# Patient Record
Sex: MALE | URBAN - METROPOLITAN AREA
[De-identification: names, ages, dates, MRNs, and addresses within clinical notes are randomized per-mention and may not be internally consistent; named-entity substitution may affect disease eponyms.]

---

## 2019-08-30 ENCOUNTER — APPOINTMENT (OUTPATIENT)
Dept: RADIOLOGY | Facility: MEDICAL CENTER | Age: 29
DRG: 083 | End: 2019-08-30
Payer: COMMERCIAL

## 2019-08-30 ENCOUNTER — HOSPITAL ENCOUNTER (OUTPATIENT)
Dept: RADIOLOGY | Facility: MEDICAL CENTER | Age: 29
End: 2019-08-30

## 2019-08-30 ENCOUNTER — APPOINTMENT (OUTPATIENT)
Dept: RADIOLOGY | Facility: MEDICAL CENTER | Age: 29
DRG: 083 | End: 2019-08-30
Attending: SURGERY
Payer: COMMERCIAL

## 2019-08-30 ENCOUNTER — HOSPITAL ENCOUNTER (INPATIENT)
Facility: MEDICAL CENTER | Age: 29
LOS: 11 days | DRG: 083 | End: 2019-09-10
Attending: EMERGENCY MEDICINE | Admitting: SURGERY
Payer: COMMERCIAL

## 2019-08-30 DIAGNOSIS — S06.9X9A TRAUMATIC BRAIN INJURY WITH LOSS OF CONSCIOUSNESS, INITIAL ENCOUNTER (HCC): ICD-10-CM

## 2019-08-30 DIAGNOSIS — T14.90XA TRAUMA: ICD-10-CM

## 2019-08-30 DIAGNOSIS — T07.XXXA MULTIPLE CONTUSIONS: ICD-10-CM

## 2019-08-30 DIAGNOSIS — I62.9 INTRACRANIAL HEMORRHAGE (HCC): ICD-10-CM

## 2019-08-30 DIAGNOSIS — S01.01XA LACERATION OF SCALP WITHOUT FOREIGN BODY, INITIAL ENCOUNTER: ICD-10-CM

## 2019-08-30 PROBLEM — S01.00XA SCALP WOUND: Status: ACTIVE | Noted: 2019-08-30

## 2019-08-30 PROBLEM — Z53.09 CONTRAINDICATION TO DEEP VEIN THROMBOSIS (DVT) PROPHYLAXIS: Status: ACTIVE | Noted: 2019-08-30

## 2019-08-30 PROBLEM — S01.80XA WOUND, OPEN, FACE: Status: ACTIVE | Noted: 2019-08-30

## 2019-08-30 PROBLEM — S06.9XAA TRAUMATIC BRAIN INJURY (HCC): Status: ACTIVE | Noted: 2019-08-30

## 2019-08-30 LAB
ABO GROUP BLD: NORMAL
ALBUMIN SERPL BCP-MCNC: 4.8 G/DL (ref 3.2–4.9)
ALBUMIN/GLOB SERPL: 1.6 G/DL
ALP SERPL-CCNC: 72 U/L (ref 30–99)
ALT SERPL-CCNC: 59 U/L (ref 2–50)
ANION GAP SERPL CALC-SCNC: 14 MMOL/L (ref 0–11.9)
APTT PPP: 21.7 SEC (ref 24.7–36)
AST SERPL-CCNC: 37 U/L (ref 12–45)
BILIRUB SERPL-MCNC: 1 MG/DL (ref 0.1–1.5)
BLD GP AB SCN SERPL QL: NORMAL
BUN SERPL-MCNC: 15 MG/DL (ref 8–22)
CALCIUM SERPL-MCNC: 9.8 MG/DL (ref 8.5–10.5)
CFT BLD TEG: 2.6 MIN (ref 5–10)
CHLORIDE SERPL-SCNC: 101 MMOL/L (ref 96–112)
CLOT ANGLE BLD TEG: 64.2 DEGREES (ref 53–72)
CLOT LYSIS 30M P MA LENFR BLD TEG: 0 % (ref 0–8)
CO2 SERPL-SCNC: 21 MMOL/L (ref 20–33)
CREAT SERPL-MCNC: 0.91 MG/DL (ref 0.5–1.4)
CT.EXTRINSIC BLD ROTEM: 2.1 MIN (ref 1–3)
ERYTHROCYTE [DISTWIDTH] IN BLOOD BY AUTOMATED COUNT: 38.5 FL (ref 35.9–50)
ETHANOL BLD-MCNC: 0.01 G/DL
GLOBULIN SER CALC-MCNC: 3 G/DL (ref 1.9–3.5)
GLUCOSE BLD-MCNC: 130 MG/DL (ref 65–99)
GLUCOSE BLD-MCNC: 134 MG/DL (ref 65–99)
GLUCOSE SERPL-MCNC: 176 MG/DL (ref 65–99)
HCT VFR BLD AUTO: 44.9 % (ref 42–52)
HGB BLD-MCNC: 16.3 G/DL (ref 14–18)
INR PPP: 0.97 (ref 0.87–1.13)
LACTATE BLD-SCNC: 2.9 MMOL/L (ref 0.5–2)
MCF BLD TEG: 64.6 MM (ref 50–70)
MCH RBC QN AUTO: 32.1 PG (ref 27–33)
MCHC RBC AUTO-ENTMCNC: 36.3 G/DL (ref 33.7–35.3)
MCV RBC AUTO: 88.6 FL (ref 81.4–97.8)
PA AA BLD-ACNC: 4.6 %
PA ADP BLD-ACNC: 98.8 %
PLATELET # BLD AUTO: 227 K/UL (ref 164–446)
PMV BLD AUTO: 11 FL (ref 9–12.9)
POTASSIUM SERPL-SCNC: 4 MMOL/L (ref 3.6–5.5)
PROT SERPL-MCNC: 7.8 G/DL (ref 6–8.2)
PROTHROMBIN TIME: 13 SEC (ref 12–14.6)
RBC # BLD AUTO: 5.07 M/UL (ref 4.7–6.1)
RH BLD: NORMAL
SODIUM SERPL-SCNC: 136 MMOL/L (ref 135–145)
TEG ALGORITHM TGALG: ABNORMAL
WBC # BLD AUTO: 27.5 K/UL (ref 4.8–10.8)

## 2019-08-30 PROCEDURE — 304999 HCHG REPAIR-SIMPLE/INTERMED LEVEL 1

## 2019-08-30 PROCEDURE — 70450 CT HEAD/BRAIN W/O DYE: CPT

## 2019-08-30 PROCEDURE — 85384 FIBRINOGEN ACTIVITY: CPT

## 2019-08-30 PROCEDURE — 85027 COMPLETE CBC AUTOMATED: CPT

## 2019-08-30 PROCEDURE — 12002 RPR S/N/AX/GEN/TRNK2.6-7.5CM: CPT | Performed by: SURGERY

## 2019-08-30 PROCEDURE — 85730 THROMBOPLASTIN TIME PARTIAL: CPT

## 2019-08-30 PROCEDURE — 82962 GLUCOSE BLOOD TEST: CPT

## 2019-08-30 PROCEDURE — 86900 BLOOD TYPING SEROLOGIC ABO: CPT

## 2019-08-30 PROCEDURE — 700105 HCHG RX REV CODE 258: Performed by: NURSE PRACTITIONER

## 2019-08-30 PROCEDURE — 305308 HCHG STAPLER,SKIN,DISP.

## 2019-08-30 PROCEDURE — 0HQ0XZZ REPAIR SCALP SKIN, EXTERNAL APPROACH: ICD-10-PCS | Performed by: SURGERY

## 2019-08-30 PROCEDURE — 86901 BLOOD TYPING SEROLOGIC RH(D): CPT

## 2019-08-30 PROCEDURE — 85576 BLOOD PLATELET AGGREGATION: CPT | Mod: 91

## 2019-08-30 PROCEDURE — 83605 ASSAY OF LACTIC ACID: CPT

## 2019-08-30 PROCEDURE — A9270 NON-COVERED ITEM OR SERVICE: HCPCS | Performed by: SURGERY

## 2019-08-30 PROCEDURE — 80053 COMPREHEN METABOLIC PANEL: CPT

## 2019-08-30 PROCEDURE — 86850 RBC ANTIBODY SCREEN: CPT

## 2019-08-30 PROCEDURE — G0390 TRAUMA RESPONS W/HOSP CRITI: HCPCS

## 2019-08-30 PROCEDURE — 770022 HCHG ROOM/CARE - ICU (200)

## 2019-08-30 PROCEDURE — 700111 HCHG RX REV CODE 636 W/ 250 OVERRIDE (IP): Performed by: SURGERY

## 2019-08-30 PROCEDURE — 85347 COAGULATION TIME ACTIVATED: CPT

## 2019-08-30 PROCEDURE — 85610 PROTHROMBIN TIME: CPT

## 2019-08-30 PROCEDURE — 700102 HCHG RX REV CODE 250 W/ 637 OVERRIDE(OP): Performed by: SURGERY

## 2019-08-30 PROCEDURE — 700105 HCHG RX REV CODE 258: Performed by: SURGERY

## 2019-08-30 PROCEDURE — 700101 HCHG RX REV CODE 250: Performed by: SURGERY

## 2019-08-30 PROCEDURE — 99291 CRITICAL CARE FIRST HOUR: CPT

## 2019-08-30 PROCEDURE — 80307 DRUG TEST PRSMV CHEM ANLYZR: CPT

## 2019-08-30 PROCEDURE — 99223 1ST HOSP IP/OBS HIGH 75: CPT | Mod: 25 | Performed by: SURGERY

## 2019-08-30 RX ORDER — ONDANSETRON 2 MG/ML
4 INJECTION INTRAMUSCULAR; INTRAVENOUS EVERY 4 HOURS PRN
Status: DISCONTINUED | OUTPATIENT
Start: 2019-08-30 | End: 2019-09-02

## 2019-08-30 RX ORDER — AMOXICILLIN 250 MG
1 CAPSULE ORAL
Status: DISCONTINUED | OUTPATIENT
Start: 2019-08-30 | End: 2019-09-10 | Stop reason: HOSPADM

## 2019-08-30 RX ORDER — POLYETHYLENE GLYCOL 3350 17 G/17G
1 POWDER, FOR SOLUTION ORAL 2 TIMES DAILY
Status: DISCONTINUED | OUTPATIENT
Start: 2019-08-30 | End: 2019-09-10 | Stop reason: HOSPADM

## 2019-08-30 RX ORDER — SODIUM CHLORIDE 9 MG/ML
INJECTION, SOLUTION INTRAVENOUS CONTINUOUS
Status: DISCONTINUED | OUTPATIENT
Start: 2019-08-30 | End: 2019-09-01

## 2019-08-30 RX ORDER — ENEMA 19; 7 G/133ML; G/133ML
1 ENEMA RECTAL
Status: DISCONTINUED | OUTPATIENT
Start: 2019-08-30 | End: 2019-09-10 | Stop reason: HOSPADM

## 2019-08-30 RX ORDER — LIDOCAINE HYDROCHLORIDE AND EPINEPHRINE BITARTRATE 20; .01 MG/ML; MG/ML
20 INJECTION, SOLUTION SUBCUTANEOUS ONCE
Status: COMPLETED | OUTPATIENT
Start: 2019-08-30 | End: 2019-08-30

## 2019-08-30 RX ORDER — FAMOTIDINE 20 MG/1
20 TABLET, FILM COATED ORAL 2 TIMES DAILY
Status: DISCONTINUED | OUTPATIENT
Start: 2019-08-30 | End: 2019-09-01

## 2019-08-30 RX ORDER — BISACODYL 10 MG
10 SUPPOSITORY, RECTAL RECTAL
Status: DISCONTINUED | OUTPATIENT
Start: 2019-08-30 | End: 2019-09-10 | Stop reason: HOSPADM

## 2019-08-30 RX ORDER — AMOXICILLIN 250 MG
1 CAPSULE ORAL NIGHTLY
Status: DISCONTINUED | OUTPATIENT
Start: 2019-08-30 | End: 2019-09-10 | Stop reason: HOSPADM

## 2019-08-30 RX ORDER — MORPHINE SULFATE 4 MG/ML
2 INJECTION, SOLUTION INTRAMUSCULAR; INTRAVENOUS
Status: DISCONTINUED | OUTPATIENT
Start: 2019-08-30 | End: 2019-09-01

## 2019-08-30 RX ORDER — OXYCODONE HYDROCHLORIDE 5 MG/1
2.5 TABLET ORAL
Status: DISCONTINUED | OUTPATIENT
Start: 2019-08-30 | End: 2019-09-06

## 2019-08-30 RX ORDER — DOCUSATE SODIUM 100 MG/1
100 CAPSULE, LIQUID FILLED ORAL 2 TIMES DAILY
Status: DISCONTINUED | OUTPATIENT
Start: 2019-08-30 | End: 2019-09-10 | Stop reason: HOSPADM

## 2019-08-30 RX ORDER — OXYCODONE HYDROCHLORIDE 5 MG/1
5 TABLET ORAL
Status: DISCONTINUED | OUTPATIENT
Start: 2019-08-30 | End: 2019-09-06

## 2019-08-30 RX ADMIN — SODIUM CHLORIDE: 9 INJECTION, SOLUTION INTRAVENOUS at 19:48

## 2019-08-30 RX ADMIN — LIDOCAINE HYDROCHLORIDE AND EPINEPHRINE 20 ML: 20; 10 INJECTION, SOLUTION INFILTRATION; PERINEURAL at 12:39

## 2019-08-30 RX ADMIN — FAMOTIDINE 20 MG: 10 INJECTION INTRAVENOUS at 17:43

## 2019-08-30 RX ADMIN — SODIUM CHLORIDE 500 MG: 9 INJECTION, SOLUTION INTRAVENOUS at 14:24

## 2019-08-30 RX ADMIN — OXYCODONE HYDROCHLORIDE 2.5 MG: 5 TABLET ORAL at 22:36

## 2019-08-30 SDOH — HEALTH STABILITY: MENTAL HEALTH: HOW OFTEN DO YOU HAVE A DRINK CONTAINING ALCOHOL?: 4 OR MORE TIMES A WEEK

## 2019-08-30 SDOH — HEALTH STABILITY: MENTAL HEALTH: HOW MANY STANDARD DRINKS CONTAINING ALCOHOL DO YOU HAVE ON A TYPICAL DAY?: 10 OR MORE

## 2019-08-30 SDOH — HEALTH STABILITY: MENTAL HEALTH: HOW OFTEN DO YOU HAVE 6 OR MORE DRINKS ON ONE OCCASION?: WEEKLY

## 2019-08-30 ASSESSMENT — COGNITIVE AND FUNCTIONAL STATUS - GENERAL
SUGGESTED CMS G CODE MODIFIER MOBILITY: CH
TOILETING: A LITTLE
DAILY ACTIVITIY SCORE: 23
SUGGESTED CMS G CODE MODIFIER DAILY ACTIVITY: CI
MOBILITY SCORE: 24

## 2019-08-30 ASSESSMENT — LIFESTYLE VARIABLES
TOTAL SCORE: 0
TOTAL SCORE: 0
AVERAGE NUMBER OF DAYS PER WEEK YOU HAVE A DRINK CONTAINING ALCOHOL: 5
EVER HAD A DRINK FIRST THING IN THE MORNING TO STEADY YOUR NERVES TO GET RID OF A HANGOVER: NO
HAVE YOU EVER FELT YOU SHOULD CUT DOWN ON YOUR DRINKING: NO
DO YOU DRINK ALCOHOL: NO
TOTAL SCORE: 0
HAVE PEOPLE ANNOYED YOU BY CRITICIZING YOUR DRINKING: NO
CONSUMPTION TOTAL: POSITIVE
ON A TYPICAL DAY WHEN YOU DRINK ALCOHOL HOW MANY DRINKS DO YOU HAVE: 24
EVER FELT BAD OR GUILTY ABOUT YOUR DRINKING: NO
ALCOHOL_USE: YES
EVER_SMOKED: YES
HOW MANY TIMES IN THE PAST YEAR HAVE YOU HAD 5 OR MORE DRINKS IN A DAY: 50
DOES PATIENT WANT TO STOP DRINKING: NO

## 2019-08-30 ASSESSMENT — PATIENT HEALTH QUESTIONNAIRE - PHQ9
SUM OF ALL RESPONSES TO PHQ9 QUESTIONS 1 AND 2: 0
2. FEELING DOWN, DEPRESSED, IRRITABLE, OR HOPELESS: NOT AT ALL
1. LITTLE INTEREST OR PLEASURE IN DOING THINGS: NOT AT ALL

## 2019-08-30 NOTE — ED NOTES
ICU RN at bedside for transfer, reports given.  All belongings with pt and Guards with pt on transfer to SICU.
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
## 2019-08-30 NOTE — ASSESSMENT & PLAN NOTE
Outside imaging with frontal and frontoparietal subarachnoid hemorrhage with small frontal subdural.  Follow up CT head showed slightly increased amount of intracranial hemorrhage.  8/31 Follow up head CT with even further worsening of right frontal lobe hemorrhage with additional scattered punctate hemorrhagic foci in the right frontal lobe and with increased right frontal and parietal subarachnoid hemorrhages.  9/4 Follow up heat CT with mild interval increase in size of right frontal lobe intraparenchymal hematoma. There is mild surrounding vasogenic edema. Improved right frontoparietal subarachnoid hemorrhage.  - TEG without significant inhibition.  9/5 Repeat head CT stable.  9/7 Plan for repeat head CT.  Non-operative management.  Post traumatic pharmacologic seizure prophylaxis for 1 week.  9/2 Cog eval completed, recommending ongoing SLP treatment, not safe for independent discharge.  Taco Muñiz MD. Neurosurgery.

## 2019-08-30 NOTE — PROGRESS NOTES
Patient arrived to [facility] at 1400 with ACLS nurse and tech.. Patient lethargic but answering all questions correctly. Moves all, pupils reactive and equal.   BP: 132/84  HR: 93  Pulse ox: 98 RA  RR: 17  WT: 85.8kg  Temp: 97.1F
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
## 2019-08-30 NOTE — ASSESSMENT & PLAN NOTE
Systemic anticoagulation contraindicated secondary to elevated bleeding risk.  RAP score 3.  9/2 Trauma screening bilateral lower extremity venous duplex negative for above knee DVT.  Ambulate TID.
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
## 2019-08-31 ENCOUNTER — APPOINTMENT (OUTPATIENT)
Dept: RADIOLOGY | Facility: MEDICAL CENTER | Age: 29
DRG: 083 | End: 2019-08-31
Attending: NEUROLOGICAL SURGERY
Payer: COMMERCIAL

## 2019-08-31 LAB
ABO + RH BLD: NORMAL
ALBUMIN SERPL BCP-MCNC: 4.4 G/DL (ref 3.2–4.9)
ALBUMIN/GLOB SERPL: 1.7 G/DL
ALP SERPL-CCNC: 64 U/L (ref 30–99)
ALT SERPL-CCNC: 46 U/L (ref 2–50)
ANION GAP SERPL CALC-SCNC: 10 MMOL/L (ref 0–11.9)
AST SERPL-CCNC: 21 U/L (ref 12–45)
BASOPHILS # BLD AUTO: 0.2 % (ref 0–1.8)
BASOPHILS # BLD: 0.03 K/UL (ref 0–0.12)
BILIRUB SERPL-MCNC: 1.2 MG/DL (ref 0.1–1.5)
BUN SERPL-MCNC: 11 MG/DL (ref 8–22)
CALCIUM SERPL-MCNC: 9.1 MG/DL (ref 8.5–10.5)
CHLORIDE SERPL-SCNC: 104 MMOL/L (ref 96–112)
CO2 SERPL-SCNC: 24 MMOL/L (ref 20–33)
CREAT SERPL-MCNC: 0.79 MG/DL (ref 0.5–1.4)
EOSINOPHIL # BLD AUTO: 0.01 K/UL (ref 0–0.51)
EOSINOPHIL NFR BLD: 0.1 % (ref 0–6.9)
ERYTHROCYTE [DISTWIDTH] IN BLOOD BY AUTOMATED COUNT: 40 FL (ref 35.9–50)
GLOBULIN SER CALC-MCNC: 2.6 G/DL (ref 1.9–3.5)
GLUCOSE BLD-MCNC: 121 MG/DL (ref 65–99)
GLUCOSE BLD-MCNC: 136 MG/DL (ref 65–99)
GLUCOSE SERPL-MCNC: 129 MG/DL (ref 65–99)
HCT VFR BLD AUTO: 40.7 % (ref 42–52)
HGB BLD-MCNC: 14.2 G/DL (ref 14–18)
IMM GRANULOCYTES # BLD AUTO: 0.05 K/UL (ref 0–0.11)
IMM GRANULOCYTES NFR BLD AUTO: 0.3 % (ref 0–0.9)
LYMPHOCYTES # BLD AUTO: 1.69 K/UL (ref 1–4.8)
LYMPHOCYTES NFR BLD: 10.8 % (ref 22–41)
MCH RBC QN AUTO: 31.1 PG (ref 27–33)
MCHC RBC AUTO-ENTMCNC: 34.9 G/DL (ref 33.7–35.3)
MCV RBC AUTO: 89.3 FL (ref 81.4–97.8)
MONOCYTES # BLD AUTO: 1.33 K/UL (ref 0–0.85)
MONOCYTES NFR BLD AUTO: 8.5 % (ref 0–13.4)
NEUTROPHILS # BLD AUTO: 12.61 K/UL (ref 1.82–7.42)
NEUTROPHILS NFR BLD: 80.1 % (ref 44–72)
NRBC # BLD AUTO: 0 K/UL
NRBC BLD-RTO: 0 /100 WBC
PLATELET # BLD AUTO: 229 K/UL (ref 164–446)
PMV BLD AUTO: 10.6 FL (ref 9–12.9)
POTASSIUM SERPL-SCNC: 3.9 MMOL/L (ref 3.6–5.5)
PROT SERPL-MCNC: 7 G/DL (ref 6–8.2)
RBC # BLD AUTO: 4.56 M/UL (ref 4.7–6.1)
SODIUM SERPL-SCNC: 138 MMOL/L (ref 135–145)
WBC # BLD AUTO: 15.7 K/UL (ref 4.8–10.8)

## 2019-08-31 PROCEDURE — 700105 HCHG RX REV CODE 258: Performed by: SURGERY

## 2019-08-31 PROCEDURE — 85025 COMPLETE CBC W/AUTO DIFF WBC: CPT

## 2019-08-31 PROCEDURE — 700102 HCHG RX REV CODE 250 W/ 637 OVERRIDE(OP): Performed by: SURGERY

## 2019-08-31 PROCEDURE — 99232 SBSQ HOSP IP/OBS MODERATE 35: CPT | Performed by: SURGERY

## 2019-08-31 PROCEDURE — 82962 GLUCOSE BLOOD TEST: CPT

## 2019-08-31 PROCEDURE — 700105 HCHG RX REV CODE 258: Performed by: NURSE PRACTITIONER

## 2019-08-31 PROCEDURE — 70450 CT HEAD/BRAIN W/O DYE: CPT

## 2019-08-31 PROCEDURE — 700111 HCHG RX REV CODE 636 W/ 250 OVERRIDE (IP): Performed by: SURGERY

## 2019-08-31 PROCEDURE — 80053 COMPREHEN METABOLIC PANEL: CPT

## 2019-08-31 PROCEDURE — 700112 HCHG RX REV CODE 229: Performed by: SURGERY

## 2019-08-31 PROCEDURE — A9270 NON-COVERED ITEM OR SERVICE: HCPCS | Performed by: SURGERY

## 2019-08-31 PROCEDURE — 770001 HCHG ROOM/CARE - MED/SURG/GYN PRIV*

## 2019-08-31 RX ORDER — LEVETIRACETAM 250 MG/1
500 TABLET ORAL 2 TIMES DAILY
Status: COMPLETED | OUTPATIENT
Start: 2019-08-31 | End: 2019-09-06

## 2019-08-31 RX ADMIN — SODIUM CHLORIDE: 9 INJECTION, SOLUTION INTRAVENOUS at 18:05

## 2019-08-31 RX ADMIN — OXYCODONE HYDROCHLORIDE 5 MG: 5 TABLET ORAL at 17:07

## 2019-08-31 RX ADMIN — MORPHINE SULFATE 2 MG: 4 INJECTION INTRAVENOUS at 15:11

## 2019-08-31 RX ADMIN — SODIUM CHLORIDE 500 MG: 9 INJECTION, SOLUTION INTRAVENOUS at 02:27

## 2019-08-31 RX ADMIN — ONDANSETRON 4 MG: 2 INJECTION INTRAMUSCULAR; INTRAVENOUS at 22:14

## 2019-08-31 RX ADMIN — FAMOTIDINE 20 MG: 20 TABLET ORAL at 06:23

## 2019-08-31 RX ADMIN — MORPHINE SULFATE 2 MG: 4 INJECTION INTRAVENOUS at 08:10

## 2019-08-31 RX ADMIN — MORPHINE SULFATE 2 MG: 4 INJECTION INTRAVENOUS at 18:11

## 2019-08-31 RX ADMIN — DOCUSATE SODIUM 100 MG: 100 CAPSULE, LIQUID FILLED ORAL at 06:23

## 2019-08-31 RX ADMIN — ONDANSETRON 4 MG: 2 INJECTION INTRAMUSCULAR; INTRAVENOUS at 18:05

## 2019-08-31 RX ADMIN — MORPHINE SULFATE 2 MG: 4 INJECTION INTRAVENOUS at 22:14

## 2019-08-31 RX ADMIN — LEVETIRACETAM 500 MG: 500 TABLET, FILM COATED ORAL at 17:02

## 2019-08-31 RX ADMIN — POLYETHYLENE GLYCOL 3350 1 PACKET: 17 POWDER, FOR SOLUTION ORAL at 17:02

## 2019-08-31 RX ADMIN — OXYCODONE HYDROCHLORIDE 5 MG: 5 TABLET ORAL at 06:24

## 2019-08-31 RX ADMIN — FAMOTIDINE 20 MG: 20 TABLET ORAL at 17:02

## 2019-08-31 RX ADMIN — SODIUM CHLORIDE: 9 INJECTION, SOLUTION INTRAVENOUS at 02:29

## 2019-08-31 RX ADMIN — DOCUSATE SODIUM 100 MG: 100 CAPSULE, LIQUID FILLED ORAL at 17:02

## 2019-08-31 ASSESSMENT — ENCOUNTER SYMPTOMS
HEADACHES: 1
MYALGIAS: 1
DOUBLE VISION: 0
SHORTNESS OF BREATH: 0
NAUSEA: 0
ABDOMINAL PAIN: 0

## 2019-08-31 ASSESSMENT — COPD QUESTIONNAIRES
DURING THE PAST 4 WEEKS HOW MUCH DID YOU FEEL SHORT OF BREATH: NONE/LITTLE OF THE TIME
HAVE YOU SMOKED AT LEAST 100 CIGARETTES IN YOUR ENTIRE LIFE: NO/DON'T KNOW
COPD SCREENING SCORE: 0
DO YOU EVER COUGH UP ANY MUCUS OR PHLEGM?: NO/ONLY WITH OCCASIONAL COLDS OR INFECTIONS

## 2019-08-31 ASSESSMENT — LIFESTYLE VARIABLES: SUBSTANCE_ABUSE: 0
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
## 2019-08-31 NOTE — THERAPY
Speech Therapy Contact Note:  Order received for clinical swallow evaluation. Per RN, swallow evaluation is no longer indicated per MD, however, pt will need cognitive evaluation. SLP to follow.

## 2019-08-31 NOTE — CARE PLAN
Problem: Knowledge Deficit  Goal: Knowledge of disease process/condition, treatment plan, diagnostic tests, and medications will improve  Intervention: Explain information regarding disease process/condition, treatment plan, diagnostic tests, and medications and document in education  Note:   Education provided on disease process and plan of care goals.      Problem: Pain Management  Goal: Pain level will decrease to patient's comfort goal  Intervention: Educate and implement non-pharmacologic comfort measures. Examples: relaxation, distration, play therapy, activity therapy, massage, etc.  Note:   Pharmacological and non pharmacological interventions in place to help with pain management.

## 2019-08-31 NOTE — CARE PLAN
Problem: Safety  Goal: Will remain free from injury  8/30/2019 1931 by Sasha Valente R.N.  Outcome: PROGRESSING AS EXPECTED  Note:   Bed is locked and in low position. Bed alarm is on. Patient is close to nursing station. Call light is within reach and educated on how to use it.        Problem: Pain Management  Goal: Pain level will decrease to patient's comfort goal  Outcome: PROGRESSING AS EXPECTED  Note:   Pain is assessed every 2 hours. Patient given extra blankets and pillows for comfort.
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
## 2019-08-31 NOTE — CARE PLAN
Problem: Safety  Goal: Will remain free from injury  Outcome: PROGRESSING AS EXPECTED  Intervention: Educate patient and significant other/support system about adaptive mobility strategies and safe transfers  Note:   Patient educated on saftey precautions. Bed in low and locked position. Call light within reach. Room near nursing station. Patient encouraged to call staff for help.     Problem: Infection  Goal: Will remain free from infection  Outcome: PROGRESSING AS EXPECTED  Intervention: Implement standard precautions and perform hand washing before and after patient contact  Note:   Hand hygiene performed before and after assessing patient. Daily CHG bath completed. Scrub the hub prior to accessing IVs. Linens changed daily and PRN when soiled.
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
## 2019-08-31 NOTE — PROGRESS NOTES
Neurosurgery Progress Note    Subjective:  HA.  No V. F/u CT stable    Exam:  GCS 14-15   PERRL    BP  Min: 89/52  Max: 143/65  Pulse  Av.4  Min: 75  Max: 105  Resp  Av.1  Min: 14  Max: 56  Temp  Av.1 °C (98.8 °F)  Min: 36.1 °C (97 °F)  Max: 37.7 °C (99.8 °F)  SpO2  Av.5 %  Min: 92 %  Max: 100 %    No data recorded    Recent Labs     19  1236 19  0430   WBC 27.5* 15.7*   RBC 5.07 4.56*   HEMOGLOBIN 16.3 14.2   HEMATOCRIT 44.9 40.7*   MCV 88.6 89.3   MCH 32.1 31.1   MCHC 36.3* 34.9   RDW 38.5 40.0   PLATELETCT 227 229   MPV 11.0 10.6     Recent Labs     19  1236 19  0430   SODIUM 136 138   POTASSIUM 4.0 3.9   CHLORIDE 101 104   CO2 21 24   GLUCOSE 176* 129*   BUN 15 11   CREATININE 0.91 0.79   CALCIUM 9.8 9.1     Recent Labs     19  1236   APTT 21.7*   INR 0.97     Recent Labs     19  1236   REACTMIN 2.6*   CLOTKINET 2.1   CLOTANGL 64.2   MAXCLOTS 64.6   [id number] 0.0   PRCINADP 98.8   PRCINAA 4.6       Intake/Output       19 - 19 - 19 0659       Total  Total       Intake    I.V.  100  1275 1375  150  -- 150    Pre-Hospital Volume 100 -- 100 -- -- --    Trauma Resuscitation Volume 0 -- 0 -- -- --    Volume (mL) (NS infusion) -- 1275 1275 150 -- 150    Blood  0  -- 0  --  -- --    PRBC Total Volume (Non-Barcoded) 0 -- 0 -- -- --    FFP Total Volume (Non-Barcoded) 0 -- 0 -- -- --    Platelets Total Volume (Non-Barcoded) 0 -- 0 -- -- --    Cryoprecipitate (Pooled) Total Volume (Non-Barcoded) 0 -- 0 -- -- --    Other  --  0 0  --  -- --    Medications (PO/Enteral Liquids) -- 0 0 -- -- --    IV Piggyback  100  200 300  0  -- 0    Volume (mL) (levETIRAcetam (KEPPRA) 500 mg in  mL IVPB) 100 200 300 0 -- 0    Total Intake 200 1475 1675 150 -- 150       Output    Urine  400  1350 1750  0  -- 0    Number of Times Voided 1 x 3 x 4 x 0 x -- 0 x    Urine Void (mL) 400 1350 1750 0 -- 0     Other  0  -- 0  --  -- --    Pre-Hospital Output 0 -- 0 -- -- --    Trauma Resuscitation Output 0 -- 0 -- -- --    Stool  --  0 0  --  -- --    Number of Times Stooled 0 x 0 x 0 x 0 x -- 0 x    Measurable Stool (mL) -- 0 0 -- -- --    Blood  0  -- 0  --  -- --    Est. Blood Loss 0 -- 0 -- -- --    Total Output 400 1350 1750 0 -- 0       Net I/O     -200 125 -75 150 -- 150            Intake/Output Summary (Last 24 hours) at 8/31/2019 0842  Last data filed at 8/31/2019 0800  Gross per 24 hour   Intake 1825 ml   Output 1750 ml   Net 75 ml            • Respiratory Care per Protocol   Continuous RT   • Pharmacy Consult Request  1 Each PHARMACY TO DOSE   • docusate sodium  100 mg BID   • senna-docusate  1 Tab Nightly   • senna-docusate  1 Tab Q24HRS PRN   • polyethylene glycol/lytes  1 Packet BID   • magnesium hydroxide  30 mL DAILY   • bisacodyl  10 mg Q24HRS PRN   • fleet  1 Each Once PRN   • oxyCODONE immediate-release  2.5 mg Q3HRS PRN   • oxyCODONE immediate-release  5 mg Q3HRS PRN   • morphine injection  2 mg Q3HRS PRN   • famotidine  20 mg BID    Or   • famotidine  20 mg BID   • ondansetron  4 mg Q4HRS PRN   • levETIRAcetam (KEPPRA) IV  500 mg BID   • insulin regular  2-9 Units Q6HRS    And   • glucose  16 g Q15 MIN PRN    And   • dextrose 10% bolus  250 mL Q15 MIN PRN   • NS   Continuous       Assessment and Plan:  OK to floor.  Neuro checks q 4 hrs.

## 2019-08-31 NOTE — PROGRESS NOTES
Unable to complete admit profile. Preferred pharmacy unable to be completed due to patient's pharmacy is at [facility].
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
## 2019-09-01 PROCEDURE — 700102 HCHG RX REV CODE 250 W/ 637 OVERRIDE(OP): Performed by: NURSE PRACTITIONER

## 2019-09-01 PROCEDURE — A9270 NON-COVERED ITEM OR SERVICE: HCPCS | Performed by: NURSE PRACTITIONER

## 2019-09-01 PROCEDURE — 700111 HCHG RX REV CODE 636 W/ 250 OVERRIDE (IP): Performed by: SURGERY

## 2019-09-01 PROCEDURE — A9270 NON-COVERED ITEM OR SERVICE: HCPCS | Performed by: SURGERY

## 2019-09-01 PROCEDURE — 700102 HCHG RX REV CODE 250 W/ 637 OVERRIDE(OP): Performed by: SURGERY

## 2019-09-01 PROCEDURE — 770001 HCHG ROOM/CARE - MED/SURG/GYN PRIV*

## 2019-09-01 PROCEDURE — 700112 HCHG RX REV CODE 229: Performed by: SURGERY

## 2019-09-01 RX ORDER — ACETAMINOPHEN 500 MG
1000 TABLET ORAL EVERY 6 HOURS
Status: DISPENSED | OUTPATIENT
Start: 2019-09-01 | End: 2019-09-06

## 2019-09-01 RX ADMIN — OXYCODONE HYDROCHLORIDE 5 MG: 5 TABLET ORAL at 02:47

## 2019-09-01 RX ADMIN — ACETAMINOPHEN 1000 MG: 500 TABLET ORAL at 12:33

## 2019-09-01 RX ADMIN — FAMOTIDINE 20 MG: 20 TABLET ORAL at 06:23

## 2019-09-01 RX ADMIN — SENNOSIDES, DOCUSATE SODIUM 1 TABLET: 50; 8.6 TABLET, FILM COATED ORAL at 22:24

## 2019-09-01 RX ADMIN — LEVETIRACETAM 500 MG: 500 TABLET, FILM COATED ORAL at 18:00

## 2019-09-01 RX ADMIN — OXYCODONE HYDROCHLORIDE 5 MG: 5 TABLET ORAL at 18:00

## 2019-09-01 RX ADMIN — ONDANSETRON 4 MG: 2 INJECTION INTRAMUSCULAR; INTRAVENOUS at 17:57

## 2019-09-01 RX ADMIN — POLYETHYLENE GLYCOL 3350 1 PACKET: 17 POWDER, FOR SOLUTION ORAL at 06:22

## 2019-09-01 RX ADMIN — ONDANSETRON 4 MG: 2 INJECTION INTRAMUSCULAR; INTRAVENOUS at 09:42

## 2019-09-01 RX ADMIN — OXYCODONE HYDROCHLORIDE 5 MG: 5 TABLET ORAL at 09:40

## 2019-09-01 RX ADMIN — DOCUSATE SODIUM 100 MG: 100 CAPSULE, LIQUID FILLED ORAL at 06:23

## 2019-09-01 RX ADMIN — MAGNESIUM HYDROXIDE 30 ML: 400 SUSPENSION ORAL at 06:21

## 2019-09-01 RX ADMIN — ACETAMINOPHEN 1000 MG: 500 TABLET ORAL at 22:25

## 2019-09-01 RX ADMIN — ACETAMINOPHEN 1000 MG: 500 TABLET ORAL at 18:01

## 2019-09-01 RX ADMIN — LEVETIRACETAM 500 MG: 500 TABLET, FILM COATED ORAL at 06:23

## 2019-09-01 RX ADMIN — OXYCODONE HYDROCHLORIDE 5 MG: 5 TABLET ORAL at 22:24

## 2019-09-01 RX ADMIN — MORPHINE SULFATE 2 MG: 4 INJECTION INTRAVENOUS at 03:38

## 2019-09-01 RX ADMIN — OXYCODONE HYDROCHLORIDE 5 MG: 5 TABLET ORAL at 06:24

## 2019-09-01 ASSESSMENT — ENCOUNTER SYMPTOMS
RESPIRATORY NEGATIVE: 1
MUSCULOSKELETAL NEGATIVE: 1
PSYCHIATRIC NEGATIVE: 1
HEADACHES: 1
ROS GI COMMENTS: BM 8/30
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
## 2019-09-01 NOTE — CARE PLAN
Problem: Safety  Goal: Will remain free from falls  Outcome: PROGRESSING AS EXPECTED  Intervention: Implement fall precautions  Note:   Patient educated on saftey precautions. Bed in low and locked position. Call light within reach. Room near nursing station. Patient encouraged to call staff for help before ambulating.      Problem: Pain Management  Goal: Pain level will decrease to patient's comfort goal  Outcome: PROGRESSING AS EXPECTED  Intervention: Follow pain managment plan developed in collaboration with patient and Interdisciplinary Team  Note:   Patient medicated for pain per MAR. Non pharmacologic pain interventions in place to decrease pain; pillows used for support and repositioning. Blankets offered. Temperature adjusted per pt request.

## 2019-09-01 NOTE — PROGRESS NOTES
RN MOBILITY NOTE     Surgery patient?: n  Date of surgery: na  Ambulated 50 ft on day of surgery? (N/A if today is not date of surgery): na   Number of times ambulated 50 feet or greater today: 2  Patient has been up to chair, edge of bed or HOB 90 degrees for all meals?: not documented  Goal met? (goal is ambulating at least 50 feet 2 times on day shift, one time on night shift): n  If patient did not meet mobility goal, why?: ? (Pt transferred to nsx unit at 2130)
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
## 2019-09-01 NOTE — PROGRESS NOTES
Pt is A&Ox 4  Ambulated: Yes (from gurney to bed upon arrival to unit)  Up with stby assist, steady gait.   Voiding: Yes  Last BM: 8/30 (pt agreed to stool softener, miralax, and MOM with morning med pass)  Pain: Controlled    Nausea improved. Pt ate a jello and was able to keep down an oral dose of oxycodone (5 mg) at 0247. Ate another jello with morning med pass @ 0623, and took all meds without return of nausea.

## 2019-09-02 ENCOUNTER — APPOINTMENT (OUTPATIENT)
Dept: RADIOLOGY | Facility: MEDICAL CENTER | Age: 29
DRG: 083 | End: 2019-09-02
Attending: NURSE PRACTITIONER
Payer: COMMERCIAL

## 2019-09-02 PROCEDURE — 92523 SPEECH SOUND LANG COMPREHEN: CPT

## 2019-09-02 PROCEDURE — 700112 HCHG RX REV CODE 229: Performed by: SURGERY

## 2019-09-02 PROCEDURE — 700111 HCHG RX REV CODE 636 W/ 250 OVERRIDE (IP): Performed by: SURGERY

## 2019-09-02 PROCEDURE — 700102 HCHG RX REV CODE 250 W/ 637 OVERRIDE(OP): Performed by: SURGERY

## 2019-09-02 PROCEDURE — 700111 HCHG RX REV CODE 636 W/ 250 OVERRIDE (IP): Performed by: NURSE PRACTITIONER

## 2019-09-02 PROCEDURE — A9270 NON-COVERED ITEM OR SERVICE: HCPCS | Performed by: NURSE PRACTITIONER

## 2019-09-02 PROCEDURE — 93970 EXTREMITY STUDY: CPT

## 2019-09-02 PROCEDURE — 93970 EXTREMITY STUDY: CPT | Mod: 26 | Performed by: INTERNAL MEDICINE

## 2019-09-02 PROCEDURE — 770001 HCHG ROOM/CARE - MED/SURG/GYN PRIV*

## 2019-09-02 PROCEDURE — A9270 NON-COVERED ITEM OR SERVICE: HCPCS | Performed by: SURGERY

## 2019-09-02 PROCEDURE — 700102 HCHG RX REV CODE 250 W/ 637 OVERRIDE(OP): Performed by: NURSE PRACTITIONER

## 2019-09-02 RX ORDER — ONDANSETRON 4 MG/1
4 TABLET, ORALLY DISINTEGRATING ORAL EVERY 4 HOURS PRN
Status: DISCONTINUED | OUTPATIENT
Start: 2019-09-02 | End: 2019-09-10 | Stop reason: HOSPADM

## 2019-09-02 RX ADMIN — DOCUSATE SODIUM 100 MG: 100 CAPSULE, LIQUID FILLED ORAL at 06:08

## 2019-09-02 RX ADMIN — MAGNESIUM HYDROXIDE 30 ML: 400 SUSPENSION ORAL at 06:07

## 2019-09-02 RX ADMIN — ACETAMINOPHEN 1000 MG: 500 TABLET ORAL at 06:08

## 2019-09-02 RX ADMIN — LEVETIRACETAM 500 MG: 500 TABLET, FILM COATED ORAL at 06:07

## 2019-09-02 RX ADMIN — POLYETHYLENE GLYCOL 3350 1 PACKET: 17 POWDER, FOR SOLUTION ORAL at 06:08

## 2019-09-02 RX ADMIN — ACETAMINOPHEN 1000 MG: 500 TABLET ORAL at 12:50

## 2019-09-02 RX ADMIN — ACETAMINOPHEN 1000 MG: 500 TABLET ORAL at 17:34

## 2019-09-02 RX ADMIN — ONDANSETRON 4 MG: 2 INJECTION INTRAMUSCULAR; INTRAVENOUS at 06:11

## 2019-09-02 RX ADMIN — OXYCODONE HYDROCHLORIDE 5 MG: 5 TABLET ORAL at 17:35

## 2019-09-02 RX ADMIN — OXYCODONE HYDROCHLORIDE 5 MG: 5 TABLET ORAL at 06:08

## 2019-09-02 RX ADMIN — ONDANSETRON 4 MG: 4 TABLET, ORALLY DISINTEGRATING ORAL at 17:33

## 2019-09-02 RX ADMIN — OXYCODONE HYDROCHLORIDE 5 MG: 5 TABLET ORAL at 12:51

## 2019-09-02 RX ADMIN — ACETAMINOPHEN 1000 MG: 500 TABLET ORAL at 23:12

## 2019-09-02 RX ADMIN — OXYCODONE HYDROCHLORIDE 5 MG: 5 TABLET ORAL at 23:12

## 2019-09-02 RX ADMIN — LEVETIRACETAM 500 MG: 500 TABLET, FILM COATED ORAL at 17:35

## 2019-09-02 RX ADMIN — ONDANSETRON 4 MG: 4 TABLET, ORALLY DISINTEGRATING ORAL at 12:50

## 2019-09-02 RX ADMIN — ONDANSETRON 4 MG: 4 TABLET, ORALLY DISINTEGRATING ORAL at 23:15

## 2019-09-02 ASSESSMENT — ENCOUNTER SYMPTOMS
RESPIRATORY NEGATIVE: 1
PSYCHIATRIC NEGATIVE: 1
MUSCULOSKELETAL NEGATIVE: 1
HEADACHES: 1
ROS GI COMMENTS: BM 9/1
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
## 2019-09-02 NOTE — PROGRESS NOTES
Neurosurgery Progress Note    Subjective:  HA. Nausea and vomiting    Exam:  GCS 14-15 Oriented   Awakens to voice  Speech clear/fluent, slight delay  Tongue ML, no drift. FCx4    BP  Min: 107/75  Max: 127/75  Pulse  Av  Min: 60  Max: 72  Resp  Av  Min: 16  Max: 18  Temp  Av.8 °C (98.3 °F)  Min: 36.7 °C (98.1 °F)  Max: 37.1 °C (98.8 °F)  SpO2  Av.8 %  Min: 92 %  Max: 98 %    No data recorded    Recent Labs     19  1236 19  0430   WBC 27.5* 15.7*   RBC 5.07 4.56*   HEMOGLOBIN 16.3 14.2   HEMATOCRIT 44.9 40.7*   MCV 88.6 89.3   MCH 32.1 31.1   MCHC 36.3* 34.9   RDW 38.5 40.0   PLATELETCT 227 229   MPV 11.0 10.6     Recent Labs     19  1236 19  0430   SODIUM 136 138   POTASSIUM 4.0 3.9   CHLORIDE 101 104   CO2 21 24   GLUCOSE 176* 129*   BUN 15 11   CREATININE 0.91 0.79   CALCIUM 9.8 9.1     Recent Labs     19  1236   APTT 21.7*   INR 0.97     Recent Labs     19  1236   REACTMIN 2.6*   CLOTKINET 2.1   CLOTANGL 64.2   MAXCLOTS 64.6   [id number] 0.0   PRCINADP 98.8   PRCINAA 4.6       Intake/Output       19 - 19 - 19 0659       Total  Total       Intake    P.O.  760  -- 760  --  -- --    P.O. 760 -- 760 -- -- --    Total Intake 760 -- 760 -- -- --       Output    Urine  --  275 275  250  -- 250    Urine Void (mL) -- 275 275 250 -- 250    Stool  --  -- --  --  -- --    Number of Times Stooled 1 x -- 1 x 1 x -- 1 x    Total Output -- 275 275 250 -- 250       Net I/O     760 -275 485 -250 -- -250            Intake/Output Summary (Last 24 hours) at 2019 1029  Last data filed at 2019 0800  Gross per 24 hour   Intake 520 ml   Output 525 ml   Net -5 ml            • ondansetron  4 mg Q4HRS PRN   • acetaminophen  1,000 mg Q6HRS   • levETIRAcetam  500 mg BID   • Respiratory Care per Protocol   Continuous RT   • docusate sodium  100 mg BID   • senna-docusate  1 Tab Nightly   • senna-docusate   1 Tab Q24HRS PRN   • polyethylene glycol/lytes  1 Packet BID   • magnesium hydroxide  30 mL DAILY   • bisacodyl  10 mg Q24HRS PRN   • fleet  1 Each Once PRN   • oxyCODONE immediate-release  2.5 mg Q3HRS PRN   • oxyCODONE immediate-release  5 mg Q3HRS PRN       Assessment and Plan:  Hospital day # 4  trace amount of right-sided superior frontal pneumocephalus with a fairly extensive subarachnoid hemorrhage in the area.  There is a subgaleal hematoma as well.    Neuro as above  CT yest stable  Cont to watch closely  Q 4 hour neuro checks
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
## 2019-09-02 NOTE — CARE PLAN
Problem: Pain Management  Goal: Pain level will decrease to patient's comfort goal  Outcome: PROGRESSING AS EXPECTED  Note:   Taught pt 0-10 pain scale and  non pharmacological method of pain mgt, encouraged to verbalize when in pain. Administered PRN pain med as needed.      Problem: Skin Integrity  Goal: Risk for impaired skin integrity will decrease  Outcome: PROGRESSING AS EXPECTED  Intervention: Assess risk factors for impaired skin integrity and/or pressure ulcers  Note:   Kept dry and clean. place pillows on bony prominences to prevent skin breakdown.

## 2019-09-03 PROBLEM — Z02.9 DISCHARGE PLANNING ISSUES: Status: ACTIVE | Noted: 2019-09-03

## 2019-09-03 LAB
ANION GAP SERPL CALC-SCNC: 9 MMOL/L (ref 0–11.9)
BUN SERPL-MCNC: 10 MG/DL (ref 8–22)
CALCIUM SERPL-MCNC: 9.7 MG/DL (ref 8.5–10.5)
CHLORIDE SERPL-SCNC: 100 MMOL/L (ref 96–112)
CO2 SERPL-SCNC: 28 MMOL/L (ref 20–33)
CREAT SERPL-MCNC: 0.9 MG/DL (ref 0.5–1.4)
ERYTHROCYTE [DISTWIDTH] IN BLOOD BY AUTOMATED COUNT: 39.5 FL (ref 35.9–50)
GLUCOSE SERPL-MCNC: 110 MG/DL (ref 65–99)
HCT VFR BLD AUTO: 45.3 % (ref 42–52)
HGB BLD-MCNC: 15.6 G/DL (ref 14–18)
MCH RBC QN AUTO: 30.5 PG (ref 27–33)
MCHC RBC AUTO-ENTMCNC: 34.4 G/DL (ref 33.7–35.3)
MCV RBC AUTO: 88.5 FL (ref 81.4–97.8)
PLATELET # BLD AUTO: 247 K/UL (ref 164–446)
PMV BLD AUTO: 9.9 FL (ref 9–12.9)
POTASSIUM SERPL-SCNC: 3.9 MMOL/L (ref 3.6–5.5)
RBC # BLD AUTO: 5.12 M/UL (ref 4.7–6.1)
SODIUM SERPL-SCNC: 137 MMOL/L (ref 135–145)
WBC # BLD AUTO: 9.3 K/UL (ref 4.8–10.8)

## 2019-09-03 PROCEDURE — 700102 HCHG RX REV CODE 250 W/ 637 OVERRIDE(OP): Performed by: NURSE PRACTITIONER

## 2019-09-03 PROCEDURE — A9270 NON-COVERED ITEM OR SERVICE: HCPCS | Performed by: NURSE PRACTITIONER

## 2019-09-03 PROCEDURE — A9270 NON-COVERED ITEM OR SERVICE: HCPCS | Performed by: SURGERY

## 2019-09-03 PROCEDURE — 700111 HCHG RX REV CODE 636 W/ 250 OVERRIDE (IP): Performed by: NURSE PRACTITIONER

## 2019-09-03 PROCEDURE — 85027 COMPLETE CBC AUTOMATED: CPT

## 2019-09-03 PROCEDURE — 700112 HCHG RX REV CODE 229: Performed by: SURGERY

## 2019-09-03 PROCEDURE — 700102 HCHG RX REV CODE 250 W/ 637 OVERRIDE(OP): Performed by: SURGERY

## 2019-09-03 PROCEDURE — 770001 HCHG ROOM/CARE - MED/SURG/GYN PRIV*

## 2019-09-03 PROCEDURE — 80048 BASIC METABOLIC PNL TOTAL CA: CPT

## 2019-09-03 PROCEDURE — 36415 COLL VENOUS BLD VENIPUNCTURE: CPT

## 2019-09-03 RX ADMIN — ACETAMINOPHEN 1000 MG: 500 TABLET ORAL at 23:57

## 2019-09-03 RX ADMIN — LEVETIRACETAM 500 MG: 500 TABLET, FILM COATED ORAL at 17:32

## 2019-09-03 RX ADMIN — OXYCODONE HYDROCHLORIDE 5 MG: 5 TABLET ORAL at 17:31

## 2019-09-03 RX ADMIN — SENNOSIDES, DOCUSATE SODIUM 1 TABLET: 50; 8.6 TABLET, FILM COATED ORAL at 21:38

## 2019-09-03 RX ADMIN — ONDANSETRON 4 MG: 4 TABLET, ORALLY DISINTEGRATING ORAL at 12:27

## 2019-09-03 RX ADMIN — POLYETHYLENE GLYCOL 3350 1 PACKET: 17 POWDER, FOR SOLUTION ORAL at 04:49

## 2019-09-03 RX ADMIN — ACETAMINOPHEN 1000 MG: 500 TABLET ORAL at 12:27

## 2019-09-03 RX ADMIN — OXYCODONE HYDROCHLORIDE 5 MG: 5 TABLET ORAL at 05:30

## 2019-09-03 RX ADMIN — ONDANSETRON 4 MG: 4 TABLET, ORALLY DISINTEGRATING ORAL at 04:48

## 2019-09-03 RX ADMIN — ONDANSETRON 4 MG: 4 TABLET, ORALLY DISINTEGRATING ORAL at 08:47

## 2019-09-03 RX ADMIN — OXYCODONE HYDROCHLORIDE 5 MG: 5 TABLET ORAL at 21:38

## 2019-09-03 RX ADMIN — ONDANSETRON 4 MG: 4 TABLET, ORALLY DISINTEGRATING ORAL at 17:31

## 2019-09-03 RX ADMIN — LEVETIRACETAM 500 MG: 500 TABLET, FILM COATED ORAL at 04:49

## 2019-09-03 RX ADMIN — ONDANSETRON 4 MG: 4 TABLET, ORALLY DISINTEGRATING ORAL at 21:39

## 2019-09-03 RX ADMIN — OXYCODONE HYDROCHLORIDE 5 MG: 5 TABLET ORAL at 08:47

## 2019-09-03 RX ADMIN — ACETAMINOPHEN 1000 MG: 500 TABLET ORAL at 17:31

## 2019-09-03 RX ADMIN — DOCUSATE SODIUM 100 MG: 100 CAPSULE, LIQUID FILLED ORAL at 17:32

## 2019-09-03 RX ADMIN — OXYCODONE HYDROCHLORIDE 5 MG: 5 TABLET ORAL at 12:27

## 2019-09-03 ASSESSMENT — ENCOUNTER SYMPTOMS
HEADACHES: 1
RESPIRATORY NEGATIVE: 1
NAUSEA: 1
MUSCULOSKELETAL NEGATIVE: 1
ROS GI COMMENTS: BM 9/1
PSYCHIATRIC NEGATIVE: 1
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
## 2019-09-03 NOTE — PROGRESS NOTES
Neurosurgery Progress Note    Subjective:  Pt awakened  C/o mild headache  Had issues with n/v last night RN states it's after pain meds. Getting zofran   None currently   No vision issues  Hasn't eaten yet     Exam:    A&O x3, GCS 15  PERRL, EOMI  Face symm, tongue midline  JUAREZ with FS, no drift       BP  Min: 111/74  Max: 137/52  Pulse  Av  Min: 65  Max: 68  Resp  Av  Min: 18  Max: 18  Temp  Av.4 °C (97.6 °F)  Min: 36.2 °C (97.1 °F)  Max: 36.7 °C (98.1 °F)  SpO2  Av.3 %  Min: 94 %  Max: 97 %    No data recorded                      Intake/Output       19 - 1959 19 - 19 0659       4923-8594 Total  1627-0271 Total       Intake    P.O.  480  -- 480  --  -- --    P.O. 480 -- 480 -- -- --    Total Intake 480 -- 480 -- -- --       Output    Urine  250  600 850  --  -- --    Number of Times Voided -- 2 x 2 x -- -- --    Urine Void (mL) 250 600 850 -- -- --    Emesis  --  3 3  --  -- --    Emesis -- 3 3 -- -- --    Stool  --  -- --  --  -- --    Number of Times Stooled 1 x 0 x 1 x -- -- --    Total Output 250 603 853 -- -- --       Net I/O     230 600 -373 -- -- --            Intake/Output Summary (Last 24 hours) at 9/3/2019 0904  Last data filed at 9/3/2019 0451  Gross per 24 hour   Intake 480 ml   Output 603 ml   Net -123 ml            • ondansetron  4 mg Q4HRS PRN   • acetaminophen  1,000 mg Q6HRS   • levETIRAcetam  500 mg BID   • Respiratory Care per Protocol   Continuous RT   • docusate sodium  100 mg BID   • senna-docusate  1 Tab Nightly   • senna-docusate  1 Tab Q24HRS PRN   • polyethylene glycol/lytes  1 Packet BID   • magnesium hydroxide  30 mL DAILY   • bisacodyl  10 mg Q24HRS PRN   • fleet  1 Each Once PRN   • oxyCODONE immediate-release  2.5 mg Q3HRS PRN   • oxyCODONE immediate-release  5 mg Q3HRS PRN       Assessment and Plan:  Hospital day # 5  trace amount of right-sided superior frontal pneumocephalus with a fairly extensive  subarachnoid hemorrhage in the area.  There is a subgaleal hematoma as well.  CT head 8/31 -stable  Q 4 hour neuro checks  keppra 500 bid  Pt/Ot  Will check labs
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
## 2019-09-03 NOTE — PROGRESS NOTES
Nurses Mobility Note    Surgery patient?:NO    Number of times ambulated 50 feet or greater today: 3  Patient has been up to chair, edge of bed or HOB 90 degrees for all meals?:YES   Goal met? Yes

## 2019-09-03 NOTE — CARE PLAN
Problem: Infection  Goal: Will remain free from infection  9/3/2019 0541 by Beatrice Lama R.N.  Outcome: PROGRESSING AS EXPECTED  9/2/2019 2359 by Beatrice Lama R.N.  Outcome: PROGRESSING AS EXPECTED  Intervention: Assess signs and symptoms of infection  9/3/2019 0541 by Beatrice Lama R.N.  Note:   Patient educated regarding infection control including hand hygiene, personal cares. Patient educated regarding s/s of infection including pain, fever, heat and redness at incision site. Patient encouraged to discuss any concerns with RN or care team. Patient verbalized understanding.     9/2/2019 2359 by Beatrice Lama R.N.  Note:   Patient educated regarding infection control including hand hygiene, personal cares. Patient educated regarding s/s of infection including pain, fever, heat and redness at wound site. Patient encouraged to discuss any concerns with RN or care team. Patient verbalized understanding.          Problem: Pain Management  Goal: Pain level will decrease to patient's comfort goal  9/3/2019 0541 by Beatrice Lama R.N.  Outcome: PROGRESSING SLOWER THAN EXPECTED  9/2/2019 2359 by Beatrice Lama R.N.  Outcome: PROGRESSING AS EXPECTED  Intervention: Follow pain managment plan developed in collaboration with patient and Interdisciplinary Team  9/3/2019 0541 by Beatrice Lama R.N.  Note:   Patient educated regarding pharmacological and non-pharmacological pain management. Review of PRN pain management, medication schedule, pain scale, and reassessment.     9/2/2019 2359 by Beatrice Lama R.N.  Note:   Patient educated regarding pharmacological and non-pharmacological pain management. Review of PRN pain management, medication schedule, pain scale, and reassessment.
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
## 2019-09-03 NOTE — DISCHARGE PLANNING
Anticipated Discharge Disposition: Return to Facility    Action: Spoke with Zahira in UR at the [facility] [phone]. She is aware that the patient needs cognitive therapy. Zahira states that they need to transfer to a care home that offers that. She request chart notes for patient be faxed so she could start working on the transfer.    Barriers to Discharge: Acceptance to another care home that offers cognitive therapy.    Plan: Return to care home
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
## 2019-09-04 ENCOUNTER — APPOINTMENT (OUTPATIENT)
Dept: RADIOLOGY | Facility: MEDICAL CENTER | Age: 29
DRG: 083 | End: 2019-09-04
Attending: NURSE PRACTITIONER
Payer: COMMERCIAL

## 2019-09-04 PROCEDURE — 700112 HCHG RX REV CODE 229: Performed by: SURGERY

## 2019-09-04 PROCEDURE — A9270 NON-COVERED ITEM OR SERVICE: HCPCS | Performed by: NURSE PRACTITIONER

## 2019-09-04 PROCEDURE — 700102 HCHG RX REV CODE 250 W/ 637 OVERRIDE(OP): Performed by: SURGERY

## 2019-09-04 PROCEDURE — 70450 CT HEAD/BRAIN W/O DYE: CPT

## 2019-09-04 PROCEDURE — G0515 COGNITIVE SKILLS DEVELOPMENT: HCPCS

## 2019-09-04 PROCEDURE — A9270 NON-COVERED ITEM OR SERVICE: HCPCS | Performed by: SURGERY

## 2019-09-04 PROCEDURE — 700102 HCHG RX REV CODE 250 W/ 637 OVERRIDE(OP): Performed by: NURSE PRACTITIONER

## 2019-09-04 PROCEDURE — 700111 HCHG RX REV CODE 636 W/ 250 OVERRIDE (IP): Performed by: NURSE PRACTITIONER

## 2019-09-04 PROCEDURE — 770001 HCHG ROOM/CARE - MED/SURG/GYN PRIV*

## 2019-09-04 RX ORDER — PROMETHAZINE HYDROCHLORIDE 25 MG/1
12.5 TABLET ORAL EVERY 6 HOURS PRN
Status: DISCONTINUED | OUTPATIENT
Start: 2019-09-04 | End: 2019-09-10 | Stop reason: HOSPADM

## 2019-09-04 RX ORDER — SCOLOPAMINE TRANSDERMAL SYSTEM 1 MG/1
1 PATCH, EXTENDED RELEASE TRANSDERMAL
Status: DISCONTINUED | OUTPATIENT
Start: 2019-09-04 | End: 2019-09-10 | Stop reason: HOSPADM

## 2019-09-04 RX ORDER — PROMETHAZINE HYDROCHLORIDE 25 MG/1
25 TABLET ORAL EVERY 6 HOURS PRN
Status: DISCONTINUED | OUTPATIENT
Start: 2019-09-04 | End: 2019-09-04

## 2019-09-04 RX ORDER — PROMETHAZINE HYDROCHLORIDE 25 MG/1
12.5 SUPPOSITORY RECTAL EVERY 6 HOURS PRN
Status: DISCONTINUED | OUTPATIENT
Start: 2019-09-04 | End: 2019-09-04

## 2019-09-04 RX ADMIN — PROMETHAZINE HYDROCHLORIDE 12.5 MG: 25 TABLET ORAL at 12:05

## 2019-09-04 RX ADMIN — OXYCODONE HYDROCHLORIDE 5 MG: 5 TABLET ORAL at 21:30

## 2019-09-04 RX ADMIN — SENNOSIDES, DOCUSATE SODIUM 1 TABLET: 50; 8.6 TABLET, FILM COATED ORAL at 21:30

## 2019-09-04 RX ADMIN — ACETAMINOPHEN 1000 MG: 500 TABLET ORAL at 12:06

## 2019-09-04 RX ADMIN — OXYCODONE HYDROCHLORIDE 5 MG: 5 TABLET ORAL at 12:06

## 2019-09-04 RX ADMIN — ACETAMINOPHEN 1000 MG: 500 TABLET ORAL at 05:00

## 2019-09-04 RX ADMIN — DOCUSATE SODIUM 100 MG: 100 CAPSULE, LIQUID FILLED ORAL at 16:34

## 2019-09-04 RX ADMIN — ONDANSETRON 4 MG: 4 TABLET, ORALLY DISINTEGRATING ORAL at 16:34

## 2019-09-04 RX ADMIN — ACETAMINOPHEN 1000 MG: 500 TABLET ORAL at 16:34

## 2019-09-04 RX ADMIN — LEVETIRACETAM 500 MG: 500 TABLET, FILM COATED ORAL at 16:34

## 2019-09-04 RX ADMIN — OXYCODONE HYDROCHLORIDE 5 MG: 5 TABLET ORAL at 05:00

## 2019-09-04 RX ADMIN — PROMETHAZINE HYDROCHLORIDE 12.5 MG: 25 TABLET ORAL at 21:30

## 2019-09-04 RX ADMIN — ONDANSETRON 4 MG: 4 TABLET, ORALLY DISINTEGRATING ORAL at 04:59

## 2019-09-04 RX ADMIN — POLYETHYLENE GLYCOL 3350 1 PACKET: 17 POWDER, FOR SOLUTION ORAL at 04:59

## 2019-09-04 RX ADMIN — LEVETIRACETAM 500 MG: 500 TABLET, FILM COATED ORAL at 05:00

## 2019-09-04 RX ADMIN — DOCUSATE SODIUM 100 MG: 100 CAPSULE, LIQUID FILLED ORAL at 05:00

## 2019-09-04 RX ADMIN — OXYCODONE HYDROCHLORIDE 5 MG: 5 TABLET ORAL at 16:34

## 2019-09-04 RX ADMIN — SCOPALAMINE 1 PATCH: 1 PATCH, EXTENDED RELEASE TRANSDERMAL at 00:41

## 2019-09-04 ASSESSMENT — ENCOUNTER SYMPTOMS
NECK PAIN: 0
DOUBLE VISION: 0
FEVER: 0
BACK PAIN: 0
BLURRED VISION: 0
CONSTIPATION: 0
TINGLING: 0
NAUSEA: 0
MYALGIAS: 0
CHILLS: 0
VOMITING: 0
SHORTNESS OF BREATH: 0
SPEECH CHANGE: 0
FOCAL WEAKNESS: 0
DIZZINESS: 0
ABDOMINAL PAIN: 0
SENSORY CHANGE: 0
HEADACHES: 0

## 2019-09-04 NOTE — PROGRESS NOTES
Neurosurgery Progress Note    Subjective:  Pt ambulating in room   C/o mild headache  Vomited last night, ate breakfast. No vomiting this am    Voiding  + bm    Exam:    A&O x3, GCS 15  PERRL, EOMI  Face symm, tongue midline  JUAREZ with FS, no drift       BP  Min: 116/75  Max: 123/82  Pulse  Av.5  Min: 58  Max: 66  Resp  Av.8  Min: 16  Max: 18  Temp  Av.8 °C (98.2 °F)  Min: 36.3 °C (97.4 °F)  Max: 37.2 °C (99 °F)  SpO2  Av.8 %  Min: 95 %  Max: 98 %    No data recorded    Recent Labs     19   WBC 9.3   RBC 5.12   HEMOGLOBIN 15.6   HEMATOCRIT 45.3   MCV 88.5   MCH 30.5   MCHC 34.4   RDW 39.5   PLATELETCT 247   MPV 9.9     Recent Labs     19   SODIUM 137   POTASSIUM 3.9   CHLORIDE 100   CO2 28   GLUCOSE 110*   BUN 10   CREATININE 0.90   CALCIUM 9.7               Intake/Output       19 - 19 - 19 0659      [id number] [id number] Total 9842-1176 [id number] Total       Intake    Total Intake -- -- -- -- -- --       Output    Stool  --  -- --  --  -- --    Number of Times Stooled -- -- -- 1 x -- 1 x    Total Output -- -- -- -- -- --       Net I/O     -- -- -- -- -- --          No intake or output data in the 24 hours ending 19         • scopolamine  1 Patch Q72HRS   • promethazine  12.5 mg Q6HRS PRN   • ondansetron  4 mg Q4HRS PRN   • acetaminophen  1,000 mg Q6HRS   • levETIRAcetam  500 mg BID   • Respiratory Care per Protocol   Continuous RT   • docusate sodium  100 mg BID   • senna-docusate  1 Tab Nightly   • senna-docusate  1 Tab Q24HRS PRN   • polyethylene glycol/lytes  1 Packet BID   • magnesium hydroxide  30 mL DAILY   • bisacodyl  10 mg Q24HRS PRN   • fleet  1 Each Once PRN   • oxyCODONE immediate-release  2.5 mg Q3HRS PRN   • oxyCODONE immediate-release  5 mg Q3HRS PRN       Assessment and Plan:  Hospital day # 6  trace amount of right-sided superior frontal pneumocephalus with a fairly extensive subarachnoid hemorrhage in the  area.  There is a subgaleal hematoma as well.  Q 4 hour neuro checks  keppra 500 bid x 7 d  Pt/Ot  Labs stable yest  Will order head ct today - if stable can dc back to [facility]  Will have pt f/u 2 weeks in clinic
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
## 2019-09-04 NOTE — CARE PLAN
Problem: Safety  Goal: Will remain free from falls  Outcome: PROGRESSING AS EXPECTED  Note:   Call light within reach. Bed locked and in lowest position. Guards at bedside. Rounding maintained.      Problem: Pain Management  Goal: Pain level will decrease to patient's comfort goal  Outcome: PROGRESSING AS EXPECTED  Note:   PRN meds as ordered.

## 2019-09-04 NOTE — PROGRESS NOTES
Assessment completed, AO x4. Discussed POC with patient, no questions or concerns at this time. Pt declined pain. Bed locked and in lowest position, call light within reach, advised to call for assistance.

## 2019-09-04 NOTE — CARE PLAN
Problem: Safety  Goal: Will remain free from injury  Outcome: PROGRESSING AS EXPECTED  Intervention: Educate patient and significant other/support system about adaptive mobility strategies and safe transfers  Note:   Round on patient hourly, bed locked and in lowest position, call light at reach, advised to call for assistance.       Problem: Pain Management  Goal: Pain level will decrease to patient's comfort goal  Outcome: PROGRESSING AS EXPECTED  Intervention: Follow pain managment plan developed in collaboration with patient and Interdisciplinary Team  Note:   Educate and implement non-pharmacologic comfort measures and give PRN pain medication per MAR

## 2019-09-04 NOTE — DISCHARGE PLANNING
Anticipated Discharge Disposition: Return to [facility] with cognitive therapy.    Action: Chart review, pt. Will need cognitive therapy upon DC and Zahira DIA[facility] states that she will need to find a FDC that offers that and have him transferred upon discharge.     Barriers to Discharge: Medical/Surgical clearance    Plan: TBD    Care Transition Team Assessment    Information Source  Orientation : Oriented x 4  Information Given By: Patient  Informant's Name: Jaun    Readmission Evaluation  Is this a readmission?: No    Elopement Risk  Legal Hold: No  Ambulatory or Self Mobile in Wheelchair: Yes  Disoriented: No  Psychiatric Symptoms: None  History of Wandering: No  Elopement this Admit: No  Vocalizing Wanting to Leave: No  Displays Behaviors, Body Language Wanting to Leave: No-Not at Risk for Elopement  Elopement Risk: Not at Risk for Elopement    Interdisciplinary Discharge Planning  Lives with - Patient's Self Care Capacity: Alone and Able to Care For Self  Patient or legal guardian wants to designate a caregiver (see row info): No  Support Systems: None  Housing / Facility: Other (Comments)(FDC)  Name of Care Facility: [facility]  Prior Services: Home-Independent    Discharge Preparedness  What is your plan after discharge?: ([facility] )  Prior Functional Level: Ambulatory, Independent with Activities of Daily Living    Functional Assesment  Prior Functional Level: Ambulatory, Independent with Activities of Daily Living    Finances  Financial Barriers to Discharge: No  Prescription Coverage: Yes    Vision / Hearing Impairment  Vision Impairment : No  Hearing Impairment : No              Domestic Abuse  Have you ever been the victim of abuse or violence?: No  Physical Abuse or Sexual Abuse: No  Verbal Abuse or Emotional Abuse: No  Possible Abuse Reported to:: Not Applicable         Discharge Risks or Barriers  Discharge risks or barriers?: Lives alone, no community  support  Patient risk factors: Lives alone and no community support    Anticipated Discharge Information  Anticipated discharge disposition: ([facility])  Discharge Address: [facility]

## 2019-09-04 NOTE — THERAPY
"Speech Language Therapy cognitive-linguistic treatment completed.   Functional Status:  Pt was alert and oriented x4, cooperative with treatment. He endorses difficulty concentrating, especially with ongoing headaches. Pt needed extra time and min cues/repetition to follow 3-step directives. Given abstract Y/N questions, pt answered with 67% acc I'ly, improving to 100% acc with min-mod cues. Pt repeated 5 digits forward (below normal limits) and 3 digits backward (below normal limits). Given 4-word recall task, pt recalled 4/4 immediately and 2/4 after a 5-min delay, benefit from semantic cues to trigger recall. With repetition/rehearsal strategies, pt recalled 4/4 after a 10-min delay. Clock drawing was accurate for numbers, spacing though pt placed hands incorrectly to encode the time and was not able to self-correct despite mod cues. Given functional reading task, pt had difficulty answering simple questions related to a medication label and significant difficulty performing clock math, which he reports was not difficult for him before the head injury. Pt continues to present with cognitive-linguistic deficits (mild-moderate in severity) and will benefit from ongoing cognitive-linguistic tx as he would have difficulty re-integrating into prior setting.   Recommendations: Ongoing cognitive-linguistic tx is indicated.  Plan of Care: Will benefit from Speech Therapy 5 times per week  Post-Acute Therapy: Transitional speech therapy is recommended.       See \"Rehab Therapy-Acute\" Patient Summary Report for complete documentation.     "

## 2019-09-05 ENCOUNTER — APPOINTMENT (OUTPATIENT)
Dept: RADIOLOGY | Facility: MEDICAL CENTER | Age: 29
DRG: 083 | End: 2019-09-05
Attending: NEUROLOGICAL SURGERY
Payer: COMMERCIAL

## 2019-09-05 ENCOUNTER — APPOINTMENT (OUTPATIENT)
Dept: RADIOLOGY | Facility: MEDICAL CENTER | Age: 29
DRG: 083 | End: 2019-09-05
Attending: NURSE PRACTITIONER
Payer: COMMERCIAL

## 2019-09-05 LAB
CFT BLD TEG: 6 MIN (ref 5–10)
CLOT ANGLE BLD TEG: 62.7 DEGREES (ref 53–72)
CLOT LYSIS 30M P MA LENFR BLD TEG: 0.1 % (ref 0–8)
CT.EXTRINSIC BLD ROTEM: 2 MIN (ref 1–3)
MCF BLD TEG: 67.2 MM (ref 50–70)
PA AA BLD-ACNC: 0 %
PA ADP BLD-ACNC: 13 %
TEG ALGORITHM TGALG: NORMAL

## 2019-09-05 PROCEDURE — 70450 CT HEAD/BRAIN W/O DYE: CPT

## 2019-09-05 PROCEDURE — A9270 NON-COVERED ITEM OR SERVICE: HCPCS | Performed by: SURGERY

## 2019-09-05 PROCEDURE — A9270 NON-COVERED ITEM OR SERVICE: HCPCS | Performed by: NURSE PRACTITIONER

## 2019-09-05 PROCEDURE — 700111 HCHG RX REV CODE 636 W/ 250 OVERRIDE (IP): Performed by: NURSE PRACTITIONER

## 2019-09-05 PROCEDURE — 36415 COLL VENOUS BLD VENIPUNCTURE: CPT

## 2019-09-05 PROCEDURE — 85384 FIBRINOGEN ACTIVITY: CPT

## 2019-09-05 PROCEDURE — 85347 COAGULATION TIME ACTIVATED: CPT

## 2019-09-05 PROCEDURE — 770001 HCHG ROOM/CARE - MED/SURG/GYN PRIV*

## 2019-09-05 PROCEDURE — 85576 BLOOD PLATELET AGGREGATION: CPT

## 2019-09-05 PROCEDURE — 700112 HCHG RX REV CODE 229: Performed by: SURGERY

## 2019-09-05 PROCEDURE — 700102 HCHG RX REV CODE 250 W/ 637 OVERRIDE(OP): Performed by: SURGERY

## 2019-09-05 PROCEDURE — 700102 HCHG RX REV CODE 250 W/ 637 OVERRIDE(OP): Performed by: NURSE PRACTITIONER

## 2019-09-05 RX ADMIN — ACETAMINOPHEN 1000 MG: 500 TABLET ORAL at 13:39

## 2019-09-05 RX ADMIN — DOCUSATE SODIUM 100 MG: 100 CAPSULE, LIQUID FILLED ORAL at 16:26

## 2019-09-05 RX ADMIN — DOCUSATE SODIUM 100 MG: 100 CAPSULE, LIQUID FILLED ORAL at 06:20

## 2019-09-05 RX ADMIN — POLYETHYLENE GLYCOL 3350 1 PACKET: 17 POWDER, FOR SOLUTION ORAL at 06:20

## 2019-09-05 RX ADMIN — LEVETIRACETAM 500 MG: 500 TABLET, FILM COATED ORAL at 06:20

## 2019-09-05 RX ADMIN — ONDANSETRON 4 MG: 4 TABLET, ORALLY DISINTEGRATING ORAL at 23:13

## 2019-09-05 RX ADMIN — SENNOSIDES, DOCUSATE SODIUM 1 TABLET: 50; 8.6 TABLET, FILM COATED ORAL at 21:19

## 2019-09-05 RX ADMIN — OXYCODONE HYDROCHLORIDE 5 MG: 5 TABLET ORAL at 09:57

## 2019-09-05 RX ADMIN — OXYCODONE HYDROCHLORIDE 5 MG: 5 TABLET ORAL at 03:27

## 2019-09-05 RX ADMIN — LEVETIRACETAM 500 MG: 500 TABLET, FILM COATED ORAL at 16:26

## 2019-09-05 RX ADMIN — OXYCODONE HYDROCHLORIDE 5 MG: 5 TABLET ORAL at 06:20

## 2019-09-05 RX ADMIN — ACETAMINOPHEN 1000 MG: 500 TABLET ORAL at 06:20

## 2019-09-05 RX ADMIN — PROMETHAZINE HYDROCHLORIDE 12.5 MG: 25 TABLET ORAL at 03:27

## 2019-09-05 RX ADMIN — POLYETHYLENE GLYCOL 3350 1 PACKET: 17 POWDER, FOR SOLUTION ORAL at 16:26

## 2019-09-05 RX ADMIN — MAGNESIUM HYDROXIDE 30 ML: 400 SUSPENSION ORAL at 06:20

## 2019-09-05 ASSESSMENT — ENCOUNTER SYMPTOMS
ABDOMINAL PAIN: 0
SENSORY CHANGE: 0
NECK PAIN: 0
NAUSEA: 0
TINGLING: 0
SPEECH CHANGE: 0
SHORTNESS OF BREATH: 0
FEVER: 0
MYALGIAS: 0
BACK PAIN: 0
BLURRED VISION: 0
FOCAL WEAKNESS: 0
HEADACHES: 1
VOMITING: 0
PHOTOPHOBIA: 0
DOUBLE VISION: 0
DIZZINESS: 0
CHILLS: 0
CONSTIPATION: 0
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
## 2019-09-05 NOTE — PROGRESS NOTES
Nurses Mobility Note    Surgery Patient?: NO    Number of times ambulated 50 feet or greater today: 3  Patient has been up to chair, edge of bed or HOB 90 degrees for all meals?: YES  Goal met? YES
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
## 2019-09-05 NOTE — PROGRESS NOTES
Called to CT x3- no answer. Called transport- pt not on list to transport to CT. Will attempt to get a hold of CT again.

## 2019-09-05 NOTE — CARE PLAN
Problem: Knowledge Deficit  Goal: Knowledge of the prescribed therapeutic regimen will improve  Outcome: PROGRESSING AS EXPECTED  Intervention: Discuss information regarding therpeutic regimen and document in education  Note:   Poc discussed- pt to CT scan this AM     Problem: Pain Management  Goal: Pain level will decrease to patient's comfort goal  Outcome: PROGRESSING AS EXPECTED  Intervention: Follow pain managment plan developed in collaboration with patient and Interdisciplinary Team  Note:   Oxy given PRN with +results. Educated pt on importance of pain control- pt verbalized understanding.
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
## 2019-09-06 LAB
ANION GAP SERPL CALC-SCNC: 8 MMOL/L (ref 0–11.9)
BUN SERPL-MCNC: 12 MG/DL (ref 8–22)
CALCIUM SERPL-MCNC: 9.7 MG/DL (ref 8.5–10.5)
CHLORIDE SERPL-SCNC: 98 MMOL/L (ref 96–112)
CO2 SERPL-SCNC: 28 MMOL/L (ref 20–33)
CREAT SERPL-MCNC: 0.85 MG/DL (ref 0.5–1.4)
ERYTHROCYTE [DISTWIDTH] IN BLOOD BY AUTOMATED COUNT: 38.9 FL (ref 35.9–50)
GLUCOSE SERPL-MCNC: 117 MG/DL (ref 65–99)
HCT VFR BLD AUTO: 44 % (ref 42–52)
HGB BLD-MCNC: 16 G/DL (ref 14–18)
MCH RBC QN AUTO: 32.2 PG (ref 27–33)
MCHC RBC AUTO-ENTMCNC: 36.4 G/DL (ref 33.7–35.3)
MCV RBC AUTO: 88.5 FL (ref 81.4–97.8)
PLATELET # BLD AUTO: 282 K/UL (ref 164–446)
PMV BLD AUTO: 9.5 FL (ref 9–12.9)
POTASSIUM SERPL-SCNC: 4.4 MMOL/L (ref 3.6–5.5)
RBC # BLD AUTO: 4.97 M/UL (ref 4.7–6.1)
SODIUM SERPL-SCNC: 134 MMOL/L (ref 135–145)
WBC # BLD AUTO: 11.9 K/UL (ref 4.8–10.8)

## 2019-09-06 PROCEDURE — 85027 COMPLETE CBC AUTOMATED: CPT

## 2019-09-06 PROCEDURE — A9270 NON-COVERED ITEM OR SERVICE: HCPCS | Performed by: NEUROLOGICAL SURGERY

## 2019-09-06 PROCEDURE — A9270 NON-COVERED ITEM OR SERVICE: HCPCS | Performed by: SURGERY

## 2019-09-06 PROCEDURE — 700102 HCHG RX REV CODE 250 W/ 637 OVERRIDE(OP): Performed by: NURSE PRACTITIONER

## 2019-09-06 PROCEDURE — 770001 HCHG ROOM/CARE - MED/SURG/GYN PRIV*

## 2019-09-06 PROCEDURE — A9270 NON-COVERED ITEM OR SERVICE: HCPCS | Performed by: NURSE PRACTITIONER

## 2019-09-06 PROCEDURE — 36415 COLL VENOUS BLD VENIPUNCTURE: CPT

## 2019-09-06 PROCEDURE — 700111 HCHG RX REV CODE 636 W/ 250 OVERRIDE (IP): Performed by: NURSE PRACTITIONER

## 2019-09-06 PROCEDURE — 700102 HCHG RX REV CODE 250 W/ 637 OVERRIDE(OP): Performed by: SURGERY

## 2019-09-06 PROCEDURE — 700112 HCHG RX REV CODE 229: Performed by: SURGERY

## 2019-09-06 PROCEDURE — 80048 BASIC METABOLIC PNL TOTAL CA: CPT

## 2019-09-06 PROCEDURE — 700102 HCHG RX REV CODE 250 W/ 637 OVERRIDE(OP): Performed by: NEUROLOGICAL SURGERY

## 2019-09-06 RX ORDER — FLUDROCORTISONE ACETATE 0.1 MG/1
0.1 TABLET ORAL 2 TIMES DAILY
Status: DISCONTINUED | OUTPATIENT
Start: 2019-09-06 | End: 2019-09-10 | Stop reason: HOSPADM

## 2019-09-06 RX ORDER — HYDROCODONE BITARTRATE AND ACETAMINOPHEN 5; 325 MG/1; MG/1
1 TABLET ORAL EVERY 6 HOURS PRN
Status: DISCONTINUED | OUTPATIENT
Start: 2019-09-06 | End: 2019-09-10 | Stop reason: HOSPADM

## 2019-09-06 RX ADMIN — DOCUSATE SODIUM 100 MG: 100 CAPSULE, LIQUID FILLED ORAL at 04:59

## 2019-09-06 RX ADMIN — HYDROCODONE BITARTRATE AND ACETAMINOPHEN 1 TABLET: 5; 325 TABLET ORAL at 21:57

## 2019-09-06 RX ADMIN — POLYETHYLENE GLYCOL 3350 1 PACKET: 17 POWDER, FOR SOLUTION ORAL at 04:58

## 2019-09-06 RX ADMIN — FLUDROCORTISONE ACETATE 0.1 MG: 0.1 TABLET ORAL at 16:16

## 2019-09-06 RX ADMIN — DOCUSATE SODIUM 100 MG: 100 CAPSULE, LIQUID FILLED ORAL at 16:16

## 2019-09-06 RX ADMIN — PROMETHAZINE HYDROCHLORIDE 12.5 MG: 25 TABLET ORAL at 07:45

## 2019-09-06 RX ADMIN — ONDANSETRON 4 MG: 4 TABLET, ORALLY DISINTEGRATING ORAL at 04:57

## 2019-09-06 RX ADMIN — LEVETIRACETAM 500 MG: 500 TABLET, FILM COATED ORAL at 04:58

## 2019-09-06 RX ADMIN — POLYETHYLENE GLYCOL 3350 1 PACKET: 17 POWDER, FOR SOLUTION ORAL at 16:16

## 2019-09-06 RX ADMIN — OXYCODONE HYDROCHLORIDE 5 MG: 5 TABLET ORAL at 04:59

## 2019-09-06 RX ADMIN — FLUDROCORTISONE ACETATE 0.1 MG: 0.1 TABLET ORAL at 12:41

## 2019-09-06 RX ADMIN — ACETAMINOPHEN 1000 MG: 500 TABLET ORAL at 04:58

## 2019-09-06 ASSESSMENT — ENCOUNTER SYMPTOMS
CONSTIPATION: 0
DIZZINESS: 0
DOUBLE VISION: 0
FEVER: 0
TINGLING: 0
NECK PAIN: 0
MYALGIAS: 0
SHORTNESS OF BREATH: 0
SPEECH CHANGE: 0
FOCAL WEAKNESS: 0
NAUSEA: 0
BACK PAIN: 0
ABDOMINAL PAIN: 0
PHOTOPHOBIA: 0
BLURRED VISION: 0
HEADACHES: 1
SENSORY CHANGE: 0
CHILLS: 0
VOMITING: 0
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
## 2019-09-06 NOTE — PROGRESS NOTES
0730Report received, plan of care reviewed and discussed, assessment complete, oriented to room, bed alarm on, nonskid socks applied, advised to call for assistance.   0930 Discussed plan of care, encouraged and answered all questions, will continue to monitor.  1130 Staples removed per MD order, all needs met at this time.  1330 Up to brush teeth, no complaints.  1530 Up in bed, no needs.  1730 Sleeping, call light within reach.  1845 Report given to next shift.

## 2019-09-06 NOTE — DISCHARGE PLANNING
Anticipated Discharge Disposition:   [facility]:    [facility] and UR RN, Zahira [phone].  She stated that patient will be transported to the [facility] in [facility] for rehabilitation.  She will provide a transport time on 9-9-19.    Barriers to Discharge:    None    Plan:    Transfer planned 9-9-19.

## 2019-09-06 NOTE — CARE PLAN
Problem: Infection  Goal: Will remain free from infection  Outcome: PROGRESSING AS EXPECTED  Intervention: Assess signs and symptoms of infection  Note:   Patient educated regarding infection control including hand hygiene, personal cares. Patient educated regarding s/s of infection including pain, fever, heat and redness at wound site. Patient encouraged to discuss any concerns with RN or care team. Patient verbalized understanding.        Problem: Pain Management  Goal: Pain level will decrease to patient's comfort goal  Outcome: PROGRESSING AS EXPECTED  Intervention: Follow pain managment plan developed in collaboration with patient and Interdisciplinary Team  Note:   Patient educated regarding pharmacological and non-pharmacological pain management. Review of PRN pain management, medication schedule, pain scale, and reassessment.

## 2019-09-06 NOTE — PROGRESS NOTES
Neurosurgery Progress Note    Subjective:  Pt awake, alert  C/o mild ha  C/o nausea this am - just took pain pill not long ago on empty stomach  Voiding  + bm this am    Exam:    A&O x3, GCS 15  PERRL, EOMI  Face symm, tongue midline  JUAREZ with FS, no drift       BP  Min: 101/56  Max: 130/88  Pulse  Av  Min: 64  Max: 90  Resp  Av.7  Min: 16  Max: 18  Temp  Av.6 °C (97.9 °F)  Min: 36.5 °C (97.7 °F)  Max: 36.7 °C (98.1 °F)  SpO2  Av.3 %  Min: 95 %  Max: 98 %    No data recorded    Recent Labs     19  0923   WBC 9.3   RBC 5.12   HEMOGLOBIN 15.6   HEMATOCRIT 45.3   MCV 88.5   MCH 30.5   MCHC 34.4   RDW 39.5   PLATELETCT 247   MPV 9.9     Recent Labs     19  0923   SODIUM 137   POTASSIUM 3.9   CHLORIDE 100   CO2 28   GLUCOSE 110*   BUN 10   CREATININE 0.90   CALCIUM 9.7         Recent Labs     19  0110   REACTMIN 6.0   CLOTKINET 2.0   CLOTANGL 62.7   MAXCLOTS 67.2   [id number] 0.1   PRCINADP 13.0   PRCINAA 0.0       Intake/Output       19 - 1959 19 - 19 0659      4405-0904 9986-0465 Total  Total       Intake    P.O.  720   720  --  -- --    P.O. 720  720 -- -- --    Total Intake 720  720 -- -- --       Output    Urine  --  -- --  --  -- --    Number of Times Voided 2 x 2 x 4 x -- -- --    Stool  --  -- --  --  -- --    Number of Times Stooled -- 0 x 0 x -- -- --    Total Output -- -- -- -- -- --       Net I/O     720 -- 720 -- -- --            Intake/Output Summary (Last 24 hours) at 2019 09  Last data filed at 2019 1700  Gross per 24 hour   Intake 480 ml   Output --   Net 480 ml            • scopolamine  1 Patch Q72HRS   • promethazine  12.5 mg Q6HRS PRN   • ondansetron  4 mg Q4HRS PRN   • acetaminophen  1,000 mg Q6HRS   • Respiratory Care per Protocol   Continuous RT   • docusate sodium  100 mg BID   • senna-docusate  1 Tab Nightly   • senna-docusate  1 Tab Q24HRS PRN   • polyethylene glycol/lytes  1 Packet BID    • magnesium hydroxide  30 mL DAILY   • bisacodyl  10 mg Q24HRS PRN   • fleet  1 Each Once PRN   • oxyCODONE immediate-release  2.5 mg Q3HRS PRN   • oxyCODONE immediate-release  5 mg Q3HRS PRN       Assessment and Plan:  Hospital day #  8 rt frontal contusions/ sah   Q 4 hour neuro checks  keppra 500 bid - completed today  Pt/Ot  teg normal   CT head worse 2 d ago, stable yest  Minimize narcs, could be source of nausea.  Will dc oxy and leave norco prn mod-severe pain. Tylenol mild-mod pain   Will keep in house another day  Check cbc, bmp today     ATTENDING ADDENDUM:  Patient seen independently and agree with above note

## 2019-09-07 ENCOUNTER — HOSPITAL ENCOUNTER (OUTPATIENT)
Dept: RADIOLOGY | Facility: MEDICAL CENTER | Age: 29
End: 2019-09-07
Attending: NEUROLOGICAL SURGERY

## 2019-09-07 LAB
ANION GAP SERPL CALC-SCNC: 9 MMOL/L (ref 0–11.9)
BUN SERPL-MCNC: 14 MG/DL (ref 8–22)
CALCIUM SERPL-MCNC: 9.7 MG/DL (ref 8.5–10.5)
CHLORIDE SERPL-SCNC: 98 MMOL/L (ref 96–112)
CO2 SERPL-SCNC: 28 MMOL/L (ref 20–33)
CREAT SERPL-MCNC: 0.93 MG/DL (ref 0.5–1.4)
GLUCOSE SERPL-MCNC: 121 MG/DL (ref 65–99)
POTASSIUM SERPL-SCNC: 4.2 MMOL/L (ref 3.6–5.5)
SODIUM SERPL-SCNC: 135 MMOL/L (ref 135–145)

## 2019-09-07 PROCEDURE — 70450 CT HEAD/BRAIN W/O DYE: CPT

## 2019-09-07 PROCEDURE — 36415 COLL VENOUS BLD VENIPUNCTURE: CPT

## 2019-09-07 PROCEDURE — 80048 BASIC METABOLIC PNL TOTAL CA: CPT

## 2019-09-07 PROCEDURE — 770001 HCHG ROOM/CARE - MED/SURG/GYN PRIV*

## 2019-09-07 PROCEDURE — 700102 HCHG RX REV CODE 250 W/ 637 OVERRIDE(OP): Performed by: NURSE PRACTITIONER

## 2019-09-07 PROCEDURE — A9270 NON-COVERED ITEM OR SERVICE: HCPCS | Performed by: NURSE PRACTITIONER

## 2019-09-07 PROCEDURE — A9270 NON-COVERED ITEM OR SERVICE: HCPCS | Performed by: NEUROLOGICAL SURGERY

## 2019-09-07 PROCEDURE — 700111 HCHG RX REV CODE 636 W/ 250 OVERRIDE (IP): Performed by: NURSE PRACTITIONER

## 2019-09-07 PROCEDURE — 700102 HCHG RX REV CODE 250 W/ 637 OVERRIDE(OP): Performed by: NEUROLOGICAL SURGERY

## 2019-09-07 RX ADMIN — ONDANSETRON 4 MG: 4 TABLET, ORALLY DISINTEGRATING ORAL at 04:22

## 2019-09-07 RX ADMIN — ONDANSETRON 4 MG: 4 TABLET, ORALLY DISINTEGRATING ORAL at 17:01

## 2019-09-07 RX ADMIN — FLUDROCORTISONE ACETATE 0.1 MG: 0.1 TABLET ORAL at 04:18

## 2019-09-07 RX ADMIN — HYDROCODONE BITARTRATE AND ACETAMINOPHEN 1 TABLET: 5; 325 TABLET ORAL at 19:05

## 2019-09-07 RX ADMIN — SCOPALAMINE 1 PATCH: 1 PATCH, EXTENDED RELEASE TRANSDERMAL at 00:18

## 2019-09-07 RX ADMIN — FLUDROCORTISONE ACETATE 0.1 MG: 0.1 TABLET ORAL at 16:12

## 2019-09-07 NOTE — PROGRESS NOTES
0830Report received, plan of care reviewed and discussed, assessment complete, oriented to room, bed alarm on, nonskid socks applied, advised to call for assistance.   1030 Discussed plan of care, encouraged and answered all questions, will continue to monitor.  1230 Up in bed, no complaints.  1430 Sleeping, call light within reach.  1630 Complaints of nausea, medication administered per MAR, will continue to monitor.  1845 Report given to next shift.

## 2019-09-07 NOTE — CARE PLAN
Problem: Communication  Goal: The ability to communicate needs accurately and effectively will improve  Outcome: PROGRESSING AS EXPECTED  Note:   Pt is able to effectively communicate needs and concerns. Pt utilizes call light appropriately to call for assistance. Will continue to monitor for additional communication needs and provide interventions as needed.      Problem: Safety  Goal: Will remain free from injury  Outcome: PROGRESSING AS EXPECTED  Note:   Fall risk has been assessed. Pt was educated on level of fall risk and to call for assistance when getting out of bed. Fall precautions are in place.  sitters at bedside. Will continue to assess fall risk and implement appropriate fall interventions.

## 2019-09-07 NOTE — PROGRESS NOTES
Neurosurgery Progress Note    Subjective:  Pt awake, alert  Denies ha or vision issues  No further vomiting or nausea  CT overnight is stable/improved    Exam:    A&O x3, GCS 15  PERRL, EOMI  Face symm, tongue midline  JUAREZ with FS, no drift  Anterior skull laceration        BP  Min: 111/78  Max: 123/80  Pulse  Av  Min: 70  Max: 77  Resp  Av.5  Min: 17  Max: 18  Temp  Av.7 °C (98.1 °F)  Min: 36.3 °C (97.3 °F)  Max: 37.4 °C (99.4 °F)  SpO2  Av.5 %  Min: 94 %  Max: 98 %    No data recorded    Recent Labs     19   WBC 11.9*   RBC 4.97   HEMOGLOBIN 16.0   HEMATOCRIT 44.0   MCV 88.5   MCH 32.2   MCHC 36.4*   RDW 38.9   PLATELETCT 282   MPV 9.5     Recent Labs     19  0713   SODIUM 134* 135   POTASSIUM 4.4 4.2   CHLORIDE 98 98   CO2 28 28   GLUCOSE 117* 121*   BUN 12 14   CREATININE 0.85 0.93   CALCIUM 9.7 9.7         Recent Labs     19  0110   REACTMIN 6.0   CLOTKINET 2.0   CLOTANGL 62.7   MAXCLOTS 67.2   [id number] 0.1   PRCINADP 13.0   PRCINAA 0.0       Intake/Output     None          No intake or output data in the 24 hours ending 1918         • HYDROcodone-acetaminophen  1 Tab Q6HRS PRN   • fludrocortisone  0.1 mg BID   • scopolamine  1 Patch Q72HRS   • promethazine  12.5 mg Q6HRS PRN   • ondansetron  4 mg Q4HRS PRN   • Respiratory Care per Protocol   Continuous RT   • docusate sodium  100 mg BID   • senna-docusate  1 Tab Nightly   • senna-docusate  1 Tab Q24HRS PRN   • polyethylene glycol/lytes  1 Packet BID   • magnesium hydroxide  30 mL DAILY   • bisacodyl  10 mg Q24HRS PRN   • fleet  1 Each Once PRN       Assessment and Plan:  Hospital day # 8 rt frontal contusions/ sah   Q 4 hour neuro checks  keppra 500 bid x 7 d  Pt/Ot  Wound to be addressed by RN, this was discussed    CT this morning was stable

## 2019-09-08 LAB
ANION GAP SERPL CALC-SCNC: 12 MMOL/L (ref 0–11.9)
BUN SERPL-MCNC: 14 MG/DL (ref 8–22)
CALCIUM SERPL-MCNC: 10.1 MG/DL (ref 8.5–10.5)
CHLORIDE SERPL-SCNC: 98 MMOL/L (ref 96–112)
CO2 SERPL-SCNC: 27 MMOL/L (ref 20–33)
CREAT SERPL-MCNC: 0.99 MG/DL (ref 0.5–1.4)
GLUCOSE SERPL-MCNC: 104 MG/DL (ref 65–99)
POTASSIUM SERPL-SCNC: 4.3 MMOL/L (ref 3.6–5.5)
SODIUM SERPL-SCNC: 137 MMOL/L (ref 135–145)

## 2019-09-08 PROCEDURE — 700102 HCHG RX REV CODE 250 W/ 637 OVERRIDE(OP): Performed by: NURSE PRACTITIONER

## 2019-09-08 PROCEDURE — A9270 NON-COVERED ITEM OR SERVICE: HCPCS | Performed by: SURGERY

## 2019-09-08 PROCEDURE — A9270 NON-COVERED ITEM OR SERVICE: HCPCS | Performed by: NURSE PRACTITIONER

## 2019-09-08 PROCEDURE — 700102 HCHG RX REV CODE 250 W/ 637 OVERRIDE(OP): Performed by: SURGERY

## 2019-09-08 PROCEDURE — 700111 HCHG RX REV CODE 636 W/ 250 OVERRIDE (IP): Performed by: NURSE PRACTITIONER

## 2019-09-08 PROCEDURE — A9270 NON-COVERED ITEM OR SERVICE: HCPCS | Performed by: NEUROLOGICAL SURGERY

## 2019-09-08 PROCEDURE — 80048 BASIC METABOLIC PNL TOTAL CA: CPT

## 2019-09-08 PROCEDURE — 700112 HCHG RX REV CODE 229: Performed by: SURGERY

## 2019-09-08 PROCEDURE — 770001 HCHG ROOM/CARE - MED/SURG/GYN PRIV*

## 2019-09-08 PROCEDURE — 700102 HCHG RX REV CODE 250 W/ 637 OVERRIDE(OP): Performed by: NEUROLOGICAL SURGERY

## 2019-09-08 PROCEDURE — 36415 COLL VENOUS BLD VENIPUNCTURE: CPT

## 2019-09-08 RX ADMIN — FLUDROCORTISONE ACETATE 0.1 MG: 0.1 TABLET ORAL at 06:32

## 2019-09-08 RX ADMIN — DOCUSATE SODIUM 100 MG: 100 CAPSULE, LIQUID FILLED ORAL at 17:21

## 2019-09-08 RX ADMIN — FLUDROCORTISONE ACETATE 0.1 MG: 0.1 TABLET ORAL at 17:21

## 2019-09-08 RX ADMIN — DOCUSATE SODIUM 100 MG: 100 CAPSULE, LIQUID FILLED ORAL at 06:32

## 2019-09-08 RX ADMIN — PROMETHAZINE HYDROCHLORIDE 12.5 MG: 25 TABLET ORAL at 02:00

## 2019-09-08 RX ADMIN — HYDROCODONE BITARTRATE AND ACETAMINOPHEN 1 TABLET: 5; 325 TABLET ORAL at 17:21

## 2019-09-08 RX ADMIN — SENNOSIDES, DOCUSATE SODIUM 1 TABLET: 50; 8.6 TABLET, FILM COATED ORAL at 20:03

## 2019-09-08 RX ADMIN — ONDANSETRON 4 MG: 4 TABLET, ORALLY DISINTEGRATING ORAL at 06:33

## 2019-09-08 NOTE — PROGRESS NOTES
Neurosurgery Progress Note    Subjective:  Pt easily awakens, alert  Reports improvement with nausea.       Exam:    A&O x3, GCS 15  PERRL, EOMI  Face symm, tongue midline  JUAREZ with FS, no drift  Anterior skull laceration        BP  Min: 106/51  Max: 123/80  Pulse  Av.8  Min: 65  Max: 70  Resp  Av.3  Min: 16  Max: 18  Temp  Av.9 °C (98.4 °F)  Min: 36.3 °C (97.3 °F)  Max: 37.6 °C (99.6 °F)  SpO2  Av.5 %  Min: 94 %  Max: 99 %    No data recorded    Recent Labs     19   WBC 11.9*   RBC 4.97   HEMOGLOBIN 16.0   HEMATOCRIT 44.0   MCV 88.5   MCH 32.2   MCHC 36.4*   RDW 38.9   PLATELETCT 282   MPV 9.5     Recent Labs     19  0922 19  0713 19  0154   SODIUM 134* 135 137   POTASSIUM 4.4 4.2 4.3   CHLORIDE 98 98 98   CO2 28 28 27   GLUCOSE 117* 121* 104*   BUN 12 14 14   CREATININE 0.85 0.93 0.99   CALCIUM 9.7 9.7 10.1               Intake/Output     None          No intake or output data in the 24 hours ending 19 0747         • HYDROcodone-acetaminophen  1 Tab Q6HRS PRN   • fludrocortisone  0.1 mg BID   • scopolamine  1 Patch Q72HRS   • promethazine  12.5 mg Q6HRS PRN   • ondansetron  4 mg Q4HRS PRN   • Respiratory Care per Protocol   Continuous RT   • docusate sodium  100 mg BID   • senna-docusate  1 Tab Nightly   • senna-docusate  1 Tab Q24HRS PRN   • polyethylene glycol/lytes  1 Packet BID   • magnesium hydroxide  30 mL DAILY   • bisacodyl  10 mg Q24HRS PRN   • fleet  1 Each Once PRN       Assessment and Plan:  Hospital day # 9 Right frontal contusions/ sah   Nm stable  Q 4 hour neuro checks  keppra 500 bid x 7 d  Pt/Ot      CT  - stable

## 2019-09-08 NOTE — CARE PLAN
Problem: Safety  Goal: Will remain free from injury  Outcome: PROGRESSING AS EXPECTED  Intervention: Collaborate with Interdisciplinary Team for safe transfer and mobilization techniques  Note:   Security guards in room at all times. Pt up self, gait steady.     Problem: Mobility  Goal: Risk for activity intolerance will decrease  Outcome: PROGRESSING AS EXPECTED  Intervention: Assess and monitor signs of activity intolerance  Note:   Pt ambulating, no assistance required.

## 2019-09-08 NOTE — PROGRESS NOTES
Report received from night shift RN, care assumed. Pt is sleeping at this time, breathing unlabored, body relaxed. No signs of distress at this time. Security guards present in the room at this time. Bed locked in lowest position, bed alarm on. Call light and personal belongings within reach.

## 2019-09-09 LAB
ANION GAP SERPL CALC-SCNC: 12 MMOL/L (ref 0–11.9)
BUN SERPL-MCNC: 12 MG/DL (ref 8–22)
CALCIUM SERPL-MCNC: 9.6 MG/DL (ref 8.5–10.5)
CHLORIDE SERPL-SCNC: 100 MMOL/L (ref 96–112)
CO2 SERPL-SCNC: 27 MMOL/L (ref 20–33)
CREAT SERPL-MCNC: 0.82 MG/DL (ref 0.5–1.4)
GLUCOSE SERPL-MCNC: 99 MG/DL (ref 65–99)
POTASSIUM SERPL-SCNC: 4.3 MMOL/L (ref 3.6–5.5)
SODIUM SERPL-SCNC: 139 MMOL/L (ref 135–145)

## 2019-09-09 PROCEDURE — 36415 COLL VENOUS BLD VENIPUNCTURE: CPT

## 2019-09-09 PROCEDURE — 700102 HCHG RX REV CODE 250 W/ 637 OVERRIDE(OP): Performed by: NEUROLOGICAL SURGERY

## 2019-09-09 PROCEDURE — 700102 HCHG RX REV CODE 250 W/ 637 OVERRIDE(OP): Performed by: NURSE PRACTITIONER

## 2019-09-09 PROCEDURE — 700112 HCHG RX REV CODE 229: Performed by: SURGERY

## 2019-09-09 PROCEDURE — A9270 NON-COVERED ITEM OR SERVICE: HCPCS | Performed by: SURGERY

## 2019-09-09 PROCEDURE — 80048 BASIC METABOLIC PNL TOTAL CA: CPT

## 2019-09-09 PROCEDURE — 770001 HCHG ROOM/CARE - MED/SURG/GYN PRIV*

## 2019-09-09 PROCEDURE — A9270 NON-COVERED ITEM OR SERVICE: HCPCS | Performed by: NURSE PRACTITIONER

## 2019-09-09 PROCEDURE — A9270 NON-COVERED ITEM OR SERVICE: HCPCS | Performed by: NEUROLOGICAL SURGERY

## 2019-09-09 RX ORDER — FLUDROCORTISONE ACETATE 0.1 MG/1
0.1 TABLET ORAL 2 TIMES DAILY
Qty: 30 TAB | Refills: 0
Start: 2019-09-09

## 2019-09-09 RX ADMIN — FLUDROCORTISONE ACETATE 0.1 MG: 0.1 TABLET ORAL at 05:50

## 2019-09-09 RX ADMIN — DOCUSATE SODIUM 100 MG: 100 CAPSULE, LIQUID FILLED ORAL at 16:18

## 2019-09-09 RX ADMIN — FLUDROCORTISONE ACETATE 0.1 MG: 0.1 TABLET ORAL at 16:18

## 2019-09-09 RX ADMIN — HYDROCODONE BITARTRATE AND ACETAMINOPHEN 1 TABLET: 5; 325 TABLET ORAL at 01:58

## 2019-09-09 RX ADMIN — DOCUSATE SODIUM 100 MG: 100 CAPSULE, LIQUID FILLED ORAL at 05:50

## 2019-09-09 NOTE — PROGRESS NOTES
Neurosurgery Progress Note    Subjective:  C/o headache, no nausea, amb, voiding , eating      Exam:    A&O x3, GCS 15  PERRL, EOMI  Face symm, tongue midline  JUAREZ with FS, no drift  Anterior skull laceration        BP  Min: 120/71  Max: 153/87  Pulse  Av.5  Min: 61  Max: 75  Resp  Av  Min: 16  Max: 18  Temp  Av.5 °C (97.7 °F)  Min: 36.1 °C (96.9 °F)  Max: 37 °C (98.6 °F)  SpO2  Av.7 %  Min: 97 %  Max: 98 %    No data recorded        Recent Labs     19  0713 19  0154 19  0953   SODIUM 135 137 139   POTASSIUM 4.2 4.3 4.3   CHLORIDE 98 98 100   CO2 28 27 27   GLUCOSE 121* 104* 99   BUN 14 14 12   CREATININE 0.93 0.99 0.82   CALCIUM 9.7 10.1 9.6               Intake/Output       19 - 19 - 09/10/19 0659       9877-4346 Total  1213-8173 Total       Intake    Total Intake -- -- -- -- -- --       Output    Urine  --  -- --  --  -- --    Number of Times Voided 1 x -- 1 x 1 x -- 1 x    Stool  --  -- --  --  -- --    Number of Times Stooled 1 x 1 x 2 x -- -- --    Total Output -- -- -- -- -- --       Net I/O     -- -- -- -- -- --          No intake or output data in the 24 hours ending 19 1239         • HYDROcodone-acetaminophen  1 Tab Q6HRS PRN   • fludrocortisone  0.1 mg BID   • scopolamine  1 Patch Q72HRS   • promethazine  12.5 mg Q6HRS PRN   • ondansetron  4 mg Q4HRS PRN   • Respiratory Care per Protocol   Continuous RT   • docusate sodium  100 mg BID   • senna-docusate  1 Tab Nightly   • senna-docusate  1 Tab Q24HRS PRN   • polyethylene glycol/lytes  1 Packet BID   • magnesium hydroxide  30 mL DAILY   • bisacodyl  10 mg Q24HRS PRN   • fleet  1 Each Once PRN       Assessment and Plan:  Hospital day # 10 Right frontal contusions/ sah   [facility]  [facility]  Follow up CT in 2 weeks  Follow up with us in 2 weeks after CT  Repeat BMP in one week and wean florinef as able    ATTENDING  ADDENDUM:  Patient seen independently and agree with above note  Needs close neurosurgery followup if he goes to halfway in [place]

## 2019-09-09 NOTE — CARE PLAN
Problem: Communication  Goal: The ability to communicate needs accurately and effectively will improve  Outcome: PROGRESSING AS EXPECTED  Intervention: [place] patient and significant other/support system to call light to alert staff of needs  Flowsheets (Taken 9/9/2019 1050)  Oriented to:: All of the Following : Location of Bathroom, Visiting Policy, Unit Routine, Call Light and Bedside Controls, Bedside Rail Policy, Smoking Policy, Rights and Responsibilities, Bedside Report, and Patient Education Notebook  Note:   Pt is able to voice his needs/feelings at this time.      Problem: Pain Management  Goal: Pain level will decrease to patient's comfort goal  Outcome: PROGRESSING AS EXPECTED  Intervention: Follow pain managment plan developed in collaboration with patient and Interdisciplinary Team  Note:   Medicated per MAR, states pain is under control at this time. 2/10.
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
## 2019-09-10 VITALS
SYSTOLIC BLOOD PRESSURE: 114 MMHG | HEIGHT: 65 IN | WEIGHT: 191.8 LBS | TEMPERATURE: 97.3 F | BODY MASS INDEX: 31.96 KG/M2 | DIASTOLIC BLOOD PRESSURE: 62 MMHG | OXYGEN SATURATION: 98 % | RESPIRATION RATE: 16 BRPM | HEART RATE: 70 BPM

## 2019-09-10 LAB
ANION GAP SERPL CALC-SCNC: 9 MMOL/L (ref 0–11.9)
BUN SERPL-MCNC: 13 MG/DL (ref 8–22)
CALCIUM SERPL-MCNC: 9.6 MG/DL (ref 8.5–10.5)
CHLORIDE SERPL-SCNC: 103 MMOL/L (ref 96–112)
CO2 SERPL-SCNC: 27 MMOL/L (ref 20–33)
CREAT SERPL-MCNC: 0.88 MG/DL (ref 0.5–1.4)
GLUCOSE SERPL-MCNC: 105 MG/DL (ref 65–99)
POTASSIUM SERPL-SCNC: 4.4 MMOL/L (ref 3.6–5.5)
SODIUM SERPL-SCNC: 139 MMOL/L (ref 135–145)

## 2019-09-10 PROCEDURE — 700102 HCHG RX REV CODE 250 W/ 637 OVERRIDE(OP): Performed by: NURSE PRACTITIONER

## 2019-09-10 PROCEDURE — A9270 NON-COVERED ITEM OR SERVICE: HCPCS | Performed by: NEUROLOGICAL SURGERY

## 2019-09-10 PROCEDURE — 80048 BASIC METABOLIC PNL TOTAL CA: CPT

## 2019-09-10 PROCEDURE — 36415 COLL VENOUS BLD VENIPUNCTURE: CPT

## 2019-09-10 PROCEDURE — A9270 NON-COVERED ITEM OR SERVICE: HCPCS | Performed by: NURSE PRACTITIONER

## 2019-09-10 PROCEDURE — 700102 HCHG RX REV CODE 250 W/ 637 OVERRIDE(OP): Performed by: NEUROLOGICAL SURGERY

## 2019-09-10 RX ADMIN — FLUDROCORTISONE ACETATE 0.1 MG: 0.1 TABLET ORAL at 16:54

## 2019-09-10 RX ADMIN — HYDROCODONE BITARTRATE AND ACETAMINOPHEN 1 TABLET: 5; 325 TABLET ORAL at 00:30

## 2019-09-10 RX ADMIN — SCOPALAMINE 1 PATCH: 1 PATCH, EXTENDED RELEASE TRANSDERMAL at 00:26

## 2019-09-10 RX ADMIN — FLUDROCORTISONE ACETATE 0.1 MG: 0.1 TABLET ORAL at 05:55

## 2019-09-10 NOTE — DISCHARGE PLANNING
Case Management Note:  ESHA LM with Zahira LUCIE again. ESHA spoke with the guards to see if they knew another number. Officer Flora called Zaina Ahuja GEOFFREY, She was able to tell me that Zahira is out of the office. She said she would have someone call me back.     [address] MORLEYN II called me back. She said that they will have to transport the patient by ambulance, which they will set up. She will be able to transfer to [facility] today. She will call back with details.     [address] called and said that they are having a problem with the transport and may need to transfer there for the night then on to [facility] in the morning. However they will not have the medication Florinef tonight or in the morning. She will call back in about a half hour and let me know what to do.

## 2019-09-10 NOTE — PROGRESS NOTES
Neurosurgery Progress Note    Subjective:  Pt awakened  C/o mild headache/ nausea  Voiding , + bm        Exam:    A&O x3, GCS 15  PERRL, EOMI  Face symm, tongue midline  JUAREZ with FS, no drift  Anterior skull laceration - staples out        BP  Min: 100/58  Max: 120/76  Pulse  Av.8  Min: 67  Max: 82  Resp  Av  Min: 18  Max: 18  Temp  Av.7 °C (98 °F)  Min: 36.4 °C (97.5 °F)  Max: 36.9 °C (98.4 °F)  SpO2  Av %  Min: 95 %  Max: 98 %    No data recorded        Recent Labs     19  0154 19  0953 09/10/19  0444   SODIUM 137 139 139   POTASSIUM 4.3 4.3 4.4   CHLORIDE 98 100 103   CO2 27 27 27   GLUCOSE 104* 99 105*   BUN 14 12 13   CREATININE 0.99 0.82 0.88   CALCIUM 10.1 9.6 9.6               Intake/Output       19 - 09/10/19 0659 09/10/19 0700 - 1959       [id number] Total  9673-3145 Total       Intake    Total Intake -- -- -- -- -- --       Output    Urine  --  -- --  --  -- --    Number of Times Voided 1 x 3 x 4 x -- -- --    Stool  --  -- --  --  -- --    Number of Times Stooled -- 2 x 2 x -- -- --    Total Output -- -- -- -- -- --       Net I/O     -- -- -- -- -- --          No intake or output data in the 24 hours ending 09/10/19 09         • HYDROcodone-acetaminophen  1 Tab Q6HRS PRN   • fludrocortisone  0.1 mg BID   • scopolamine  1 Patch Q72HRS   • promethazine  12.5 mg Q6HRS PRN   • ondansetron  4 mg Q4HRS PRN   • Respiratory Care per Protocol   Continuous RT   • docusate sodium  100 mg BID   • senna-docusate  1 Tab Nightly   • senna-docusate  1 Tab Q24HRS PRN   • polyethylene glycol/lytes  1 Packet BID   • magnesium hydroxide  30 mL DAILY   • bisacodyl  10 mg Q24HRS PRN   • fleet  1 Each Once PRN       Assessment and Plan:  Hospital day # 11 Right frontal contusions/ sah   Neuro stable   on florinef  Discharge to [facility]  Follow up CT in 2 weeks  Follow up with us in 2 weeks after CT  Repeat BMP in one week and wean florinef as  able

## 2019-09-10 NOTE — DISCHARGE PLANNING
Case Management Note:    MUSTAPHA for Zahira FAULKNER at [facility]. Requested a call back to determine when patient will be transported to [facility].

## 2019-09-10 NOTE — CARE PLAN
Problem: Communication  Goal: The ability to communicate needs accurately and effectively will improve  Outcome: PROGRESSING AS EXPECTED  Intervention: [place] patient and significant other/support system to call light to alert staff of needs  Flowsheets (Taken 9/9/2019 1050)  Oriented to:: All of the Following : Location of Bathroom, Visiting Policy, Unit Routine, Call Light and Bedside Controls, Bedside Rail Policy, Smoking Policy, Rights and Responsibilities, Bedside Report, and Patient Education Notebook  Note:   Pt is able to voice his needs/feelings at this time.      Problem: Safety  Goal: Will remain free from injury  Outcome: PROGRESSING AS EXPECTED  Note:   Safety guards at the bedside at all times, hourly rounding.

## 2019-09-10 NOTE — DISCHARGE INSTRUCTIONS
Discharge Instructions      Follow up CT in 2 weeks  Follow up with us in 2 weeks after CT  Repeat BMP in one week and wean florinef as able    Discharged to [facility] by car with escort. Discharged via wheelchair, hospital escort: Yes.  Special equipment needed: Not Applicable    Be sure to schedule a follow-up appointment with your primary care doctor or any specialists as instructed.     Discharge Plan:   Smoking Cessation Offered: Patient Refused  Influenza Vaccine Indication: Patient Refuses    I understand that a diet low in cholesterol, fat, and sodium is recommended for good health. Unless I have been given specific instructions below for another diet, I accept this instruction as my diet prescription.          Special Instructions:       Subarachnoid Hemorrhage  Subarachnoid hemorrhage is bleeding in the area between the brain and the membrane that covers the brain. The bleeding puts more pressure on the brain and stops blood from reaching some areas of the brain. It is very serious. It may cause brain damage, stroke, or death if not treated. You must be treated in the hospital right away.  What increases the risk?  You may be more likely to have this condition if you:  · Smoke.  · Have high blood pressure (hypertension).  · Drink too much alcohol.  · Are a female, especially after menopause.  · Have a family history of disease in the blood vessels of the brain.  · Have a certain inherited kidney disease or connective tissue disease.  What are the signs or symptoms?  · Having a sudden, severe headache.  · Feeling sick to your stomach (nauseous) or throwing up (vomiting) combined with other problems.  · Suddenly feeling weak.  · Losing feeling on your face, arm, or leg, especially on one side of the body.  · Suddenly having trouble walking or moving your arms or legs.  · Suddenly feeling confused.  · Suddenly having a change in mood or personality.  · Having trouble talking or  understanding.  · Having trouble swallowing.  · Suddenly having trouble seeing.  · Seeing double.  · Feeling dizzy.  · Losing your balance or coordination.  · Having light bother or hurt your eyes.  · Having a stiff neck.  Follow these instructions at home:  · Take all medicines exactly as told by your doctor.  · Eat healthy foods if you can swallow.  ¨ Eat foods that are low in salt and cholesterol.  ¨ Eat foods that are low in saturated and trans fat.  ¨ If told, eat soft or pureed foods so that you do not choke.  ¨ If told, take small bites of food so that you do not choke.  · Rest as told by your doctor.  · Limit your activity as told by your doctor.  · Do not smoke.  · Limit how much alcohol you drink.  ¨ Men-drink no more than 2 drinks a day.  ¨ Women who are not pregnant-drink no more than 1 drink a day.  · Make changes to your lifestyle as told by your doctor.  · Keep track of your blood pressure as told by your doctor.  · Keep your home safe so you do not fall.  ¨ Put grab bars in the bedroom and bathroom.  ¨ Raise toilet seats.  ¨ Put a seat in the shower.  · Go to therapy sessions as told by your doctor. This may include physical, occupational, and speech therapy.  · Use a walker or cane at all times, if told to do so.  · Keep all follow-up visits with your doctor and other specialists.  Get help right away if:  · You have a sudden, severe headache with no known cause.  · You are sick to your stomach or throw up, and have another problem.  · You have a sudden weakness.  · You lose feeling on one side of your body.  · You suddenly have trouble walking or moving arms or legs.  · You suddenly feel confused.  · You have trouble talking or understanding.  · You suddenly have trouble seeing.  · You lose your balance or your movements are not coordinated.  · You have a stiff neck.  · You have trouble breathing.  · You are partly or totally unaware of what is going on around you.  The symptoms above may be a sign  of a serious problem that is an emergency. Do not wait to see if the symptoms will go away. Get medical help right away. Call your local emergency services (911 in [place] Do not drive yourself to the hospital.   This information is not intended to replace advice given to you by your health care provider. Make sure you discuss any questions you have with your health care provider.  Document Released: 04/14/2014 Document Revised: 05/25/2017 Document Reviewed: 01/31/2014  [employer] Interactive Patient Education © 2017 [employer]      · Is patient discharged on Warfarin / Coumadin?   No     Depression / Suicide Risk    As you are discharged from this [facility] facility, it is important to learn how to keep safe from harming yourself.    Recognize the warning signs:  · Abrupt changes in personality, positive or negative- including increase in energy   · Giving away possessions  · Change in eating patterns- significant weight changes-  positive or negative  · Change in sleeping patterns- unable to sleep or sleeping all the time   · Unwillingness or inability to communicate  · Depression  · Unusual sadness, discouragement and loneliness  · Talk of wanting to die  · Neglect of personal appearance   · Rebelliousness- reckless behavior  · Withdrawal from people/activities they love  · Confusion- inability to concentrate     If you or a loved one observes any of these behaviors or has concerns about self-harm, here's what you can do:  · Talk about it- your feelings and reasons for harming yourself  · Remove any means that you might use to hurt yourself (examples: pills, rope, extension cords, firearm)  · Get professional help from the community (Mental Health, Substance Abuse, psychological counseling)  · Do not be alone:Call your Safe Contact- someone whom you trust who will be there for you.  · Call your local CRISIS HOTLINE 664-7894 or 531-164-8806  · Call your local Children's Mobile Crisis Response Team [place] (669) 298-6538 or www.[employer]  · Call the toll free National Suicide Prevention Hotlines   · National Suicide Prevention Lifeline 627-507-XQXY (2001)  · National [employer] Line Network 800-SUICIDE (708-5805)

## 2019-09-10 NOTE — PROGRESS NOTES
Report received from night shift RN, care assumed. Pt is sleeping at this time, breathing unlabored, body relaxed. No signs of distress at this time. Two security guards in room at this time. Bed locked in lowest position, bed alarm on. Call light and personal belongings within reach.

## 2019-09-11 NOTE — PROGRESS NOTES
Pt out of the room with [facility] security at this time. Discharge instructions given to security, reviewed with pt, pt verbalized understanding. Report given to Víctor MCCABE  in [facility]

## 2022-12-21 NOTE — PROGRESS NOTES
Nerve Block    Date/Time: 12/21/2022 7:15 AM  Performed by: Jessica Biswas CRNA  Authorized by: Maximilian Pires MD     Block Type: femoral  Laterality:  Left  Patient Location:  Pre-op  Indication: post-op pain management at surgeon's request    Preanesthetic Checklist Patient Identified (2 criteria), Block Plan Confirmed, Resuscitation Equipment Available, Supplemental O2 (if needed), Allergies Confirmed, Block Site Marked (if applicable), Monitors Applied, Aseptic Technique, Coagulation Status, Necessary Block Equipment Present, Timeout Performed, IV Access Functioning, Consent Verified, Drugs/Solutions Labeled and Sedation Given (if needed)    Patient Position:  Supine  Prep:  Chlorhexidine gluconate (CHG)  Max Sterile Barrier Technique:  Hand washing, cap/mask and sterile gloves  Monitoring:  Continuous pulse oximetry, EKG and blood pressure  Injection Technique:  Single-shot  Local Infiltration:  Lidocaine  Strength:  1%  Dose:  2 mL  Needle Gauge:  20 G  Needle Length:  4 in  Needle Insertion Depth:  4 cm  Needle Localization:  Ultrasound guidance and nerve stimulator  Injection Assessment:  No paresthesia on injection, no resistance to injection, negative aspiration for heme, incremental injection and local visualized surrounding nerve on ultrasound  Patient Condition:  Tolerated well, no immediate complications  Type of Motor Response: quadriceps    Paresthesia Pain:  None  Heart Rate Change: No    Slowly Injected: Yes    Performed By:  CRNA  CRNA:  Jessica Biswas CRNA  Start Time:12/21/2022 7:15 AM  Stop Time: 12/21/2022 7:18 AM Report received from night shift RN, care assumed. Pt is sleeping at this time, breathing unlabored, body relaxed. No signs of distress at this time. Two security guards present in room at this time. Bed locked in lowest position, bed alarm on. Call light and personal belongings within reach.

## 2024-09-27 NOTE — DISCHARGE PLANNING
Case Management Note:  Spoke with Marino Scott SRN II, transport team will be here in about an hour and a half. They are unable to go all the way to the new [facility] and need 2 florinef for tonight and in the morning. TT to Bre MUNGUIA. ESHA will continue to follow.     1505 ESHA spoke with BS RN, she said she can give him a dose tonight before he leaves, so we only need 1 pill for in the morning. Bre MUNGUIA is in surgery at this time. Page sent by her office.    1535 Bre Padron called to let me know that if the patient has a dose tonight it would be ok to miss morning dose as long as he is able to restart.     1540 Called Marino at [facility] to find out what number Queenie MCCORMICKRGEOFFREY will call report into. ESHA was given the name Saskia DIAZ RN at [facility] [phone], ESHA spoke with Saskia and was given the number [phone]  Which is the nursing station. This is the number to call in report. Patient is going to the [facility] in [facility].   Gave report to ASAD Gonzáles via telephone.